# Patient Record
Sex: MALE | Race: BLACK OR AFRICAN AMERICAN | NOT HISPANIC OR LATINO | Employment: OTHER | URBAN - METROPOLITAN AREA
[De-identification: names, ages, dates, MRNs, and addresses within clinical notes are randomized per-mention and may not be internally consistent; named-entity substitution may affect disease eponyms.]

---

## 2017-01-05 ENCOUNTER — HISTORICAL (OUTPATIENT)
Dept: LAB | Facility: HOSPITAL | Age: 52
End: 2017-01-05

## 2017-01-13 ENCOUNTER — HISTORICAL (OUTPATIENT)
Dept: WOUND CARE | Facility: HOSPITAL | Age: 52
End: 2017-01-13

## 2017-02-06 ENCOUNTER — HISTORICAL (OUTPATIENT)
Dept: WOUND CARE | Facility: HOSPITAL | Age: 52
End: 2017-02-06

## 2017-04-18 ENCOUNTER — HISTORICAL (OUTPATIENT)
Dept: WOUND CARE | Facility: HOSPITAL | Age: 52
End: 2017-04-18

## 2017-04-19 ENCOUNTER — HISTORICAL (OUTPATIENT)
Dept: LAB | Facility: HOSPITAL | Age: 52
End: 2017-04-19

## 2017-05-02 ENCOUNTER — HISTORICAL (OUTPATIENT)
Dept: LAB | Facility: HOSPITAL | Age: 52
End: 2017-05-02

## 2017-05-02 LAB
ABS NEUT (OLG): 6.39
ALBUMIN SERPL-MCNC: 3.1 GM/DL (ref 3.4–5)
ALBUMIN/GLOB SERPL: 0.7 RATIO (ref 1.1–2)
ALP SERPL-CCNC: 172 UNIT/L (ref 50–136)
ALT SERPL-CCNC: 35 UNIT/L (ref 12–78)
AST SERPL-CCNC: 26 UNIT/L (ref 10–37)
BASOPHILS # BLD AUTO: 0.05 X10(3)/MCL
BASOPHILS NFR BLD AUTO: 0.5 %
BILIRUB SERPL-MCNC: 0.3 MG/DL (ref 0.2–1)
BILIRUBIN DIRECT+TOT PNL SERPL-MCNC: 0.14 MG/DL (ref 0.05–0.2)
BILIRUBIN DIRECT+TOT PNL SERPL-MCNC: 0.16 MG/DL
BUN SERPL-MCNC: 15 MG/DL (ref 7–18)
CALCIUM SERPL-MCNC: 8.8 MG/DL (ref 8.5–10.1)
CHLORIDE SERPL-SCNC: 98 MMOL/L (ref 98–107)
CO2 SERPL-SCNC: 27 MMOL/L (ref 21–32)
CREAT SERPL-MCNC: 0.96 MG/DL (ref 0.7–1.3)
EOSINOPHIL # BLD AUTO: 0.12 X10(3)/MCL
EOSINOPHIL NFR BLD AUTO: 1.3 %
ERYTHROCYTE [DISTWIDTH] IN BLOOD BY AUTOMATED COUNT: 16 %
ERYTHROCYTE [SEDIMENTATION RATE] IN BLOOD: 63 MM/HR (ref 0–15)
GLOBULIN SER-MCNC: 4.3 GM/DL (ref 2.4–3.5)
GLUCOSE SERPL-MCNC: 219 MG/DL (ref 74–106)
HCT VFR BLD AUTO: 31.4 % (ref 39–49)
HGB BLD-MCNC: 10.3 GM/DL (ref 12.6–16.6)
IMM GRANULOCYTES # BLD AUTO: 0.02 10*3/UL (ref 0–0.1)
IMM GRANULOCYTES NFR BLD AUTO: 0.2 % (ref 0–1)
LYMPHOCYTES # BLD AUTO: 2.42 X10(3)/MCL
LYMPHOCYTES NFR BLD AUTO: 25.4 %
MCH RBC QN AUTO: 29.6 PG (ref 27–33)
MCHC RBC AUTO-ENTMCNC: 32.8 GM/DL (ref 32–35)
MCV RBC AUTO: 90.2 FL (ref 84–97)
MONOCYTES # BLD AUTO: 0.52 X10(3)/MCL
MONOCYTES NFR BLD AUTO: 5.5 %
NEUTROPHILS # BLD AUTO: 6.39 X10(3)/MCL
NEUTROPHILS NFR BLD AUTO: 67.1 %
PLATELET # BLD AUTO: 651 X10(3)/MCL (ref 151–368)
PMV BLD AUTO: 8 FL
POTASSIUM SERPL-SCNC: 4.5 MMOL/L (ref 3.5–5.1)
PROT SERPL-MCNC: 7.4 GM/DL (ref 6.4–8.2)
RBC # BLD AUTO: 3.48 X10(6)/MCL (ref 4.3–5.6)
SODIUM SERPL-SCNC: 132 MMOL/L (ref 136–145)
WBC # SPEC AUTO: 9.52 X10(3)/MCL (ref 3.4–9.2)

## 2017-05-29 ENCOUNTER — HISTORICAL (OUTPATIENT)
Dept: LAB | Facility: HOSPITAL | Age: 52
End: 2017-05-29

## 2017-05-29 LAB
ABS NEUT (OLG): 7.1
ALBUMIN SERPL-MCNC: 3.1 GM/DL (ref 3.4–5)
ALBUMIN/GLOB SERPL: 0.7 RATIO (ref 1.1–2)
ALP SERPL-CCNC: 151 UNIT/L (ref 50–136)
ALT SERPL-CCNC: 23 UNIT/L (ref 12–78)
AST SERPL-CCNC: 21 UNIT/L (ref 10–37)
BASOPHILS # BLD AUTO: 0.04 X10(3)/MCL
BASOPHILS NFR BLD AUTO: 0.4 %
BILIRUB SERPL-MCNC: 0.2 MG/DL (ref 0.2–1)
BILIRUBIN DIRECT+TOT PNL SERPL-MCNC: 0.09 MG/DL
BILIRUBIN DIRECT+TOT PNL SERPL-MCNC: 0.11 MG/DL (ref 0.05–0.2)
BUN SERPL-MCNC: 6 MG/DL (ref 7–18)
CALCIUM SERPL-MCNC: 8.5 MG/DL (ref 8.5–10.1)
CHLORIDE SERPL-SCNC: 85 MMOL/L (ref 98–107)
CO2 SERPL-SCNC: 23.3 MMOL/L (ref 21–32)
CREAT SERPL-MCNC: 0.57 MG/DL (ref 0.7–1.3)
EOSINOPHIL # BLD AUTO: 0.11 X10(3)/MCL
EOSINOPHIL NFR BLD AUTO: 1.1 %
ERYTHROCYTE [DISTWIDTH] IN BLOOD BY AUTOMATED COUNT: 13 %
ERYTHROCYTE [SEDIMENTATION RATE] IN BLOOD: 38 MM/HR (ref 0–15)
GLOBULIN SER-MCNC: 4.5 GM/DL (ref 2.4–3.5)
GLUCOSE SERPL-MCNC: 300 MG/DL (ref 74–106)
HCT VFR BLD AUTO: 34.8 % (ref 39–49)
HGB BLD-MCNC: 12.6 GM/DL (ref 12.6–16.6)
IMM GRANULOCYTES # BLD AUTO: 0.03 10*3/UL (ref 0–0.1)
IMM GRANULOCYTES NFR BLD AUTO: 0.3 % (ref 0–1)
LYMPHOCYTES # BLD AUTO: 2.39 X10(3)/MCL
LYMPHOCYTES NFR BLD AUTO: 23.2 %
MCH RBC QN AUTO: 29.9 PG (ref 27–33)
MCHC RBC AUTO-ENTMCNC: 36.2 GM/DL (ref 32–35)
MCV RBC AUTO: 82.5 FL (ref 84–97)
MONOCYTES # BLD AUTO: 0.63 X10(3)/MCL
MONOCYTES NFR BLD AUTO: 6.1 %
NEUTROPHILS # BLD AUTO: 7.1 X10(3)/MCL
NEUTROPHILS NFR BLD AUTO: 68.9 %
PLATELET # BLD AUTO: 554 X10(3)/MCL (ref 151–368)
PMV BLD AUTO: 8 FL
POTASSIUM SERPL-SCNC: 3.7 MMOL/L (ref 3.5–5.1)
PROT SERPL-MCNC: 7.6 GM/DL (ref 6.4–8.2)
RBC # BLD AUTO: 4.22 X10(6)/MCL (ref 4.3–5.6)
SODIUM SERPL-SCNC: 123 MMOL/L (ref 136–145)
WBC # SPEC AUTO: 10.3 X10(3)/MCL (ref 3.4–9.2)

## 2017-07-12 ENCOUNTER — HISTORICAL (OUTPATIENT)
Dept: LAB | Facility: HOSPITAL | Age: 52
End: 2017-07-12

## 2017-07-12 LAB
ABS NEUT (OLG): 6.57
ALBUMIN SERPL-MCNC: 3 GM/DL (ref 3.4–5)
ALBUMIN/GLOB SERPL: 0.6 RATIO (ref 1.1–2)
ALP SERPL-CCNC: 135 UNIT/L (ref 50–136)
ALT SERPL-CCNC: 39 UNIT/L (ref 12–78)
AST SERPL-CCNC: 28 UNIT/L (ref 10–37)
BASOPHILS # BLD AUTO: 0.04 X10(3)/MCL
BASOPHILS NFR BLD AUTO: 0.4 %
BILIRUB SERPL-MCNC: 0.2 MG/DL (ref 0.2–1)
BILIRUBIN DIRECT+TOT PNL SERPL-MCNC: 0.08 MG/DL (ref 0.05–0.2)
BILIRUBIN DIRECT+TOT PNL SERPL-MCNC: 0.12 MG/DL
BUN SERPL-MCNC: 13 MG/DL (ref 7–18)
CALCIUM SERPL-MCNC: 8.9 MG/DL (ref 8.5–10.1)
CHLORIDE SERPL-SCNC: 94 MMOL/L (ref 98–107)
CO2 SERPL-SCNC: 28.2 MMOL/L (ref 21–32)
CREAT SERPL-MCNC: 0.94 MG/DL (ref 0.7–1.3)
EOSINOPHIL # BLD AUTO: 0.13 X10(3)/MCL
EOSINOPHIL NFR BLD AUTO: 1.2 %
ERYTHROCYTE [DISTWIDTH] IN BLOOD BY AUTOMATED COUNT: 13 %
ERYTHROCYTE [SEDIMENTATION RATE] IN BLOOD: 51 MM/HR (ref 0–15)
GLOBULIN SER-MCNC: 4.8 GM/DL (ref 2.4–3.5)
GLUCOSE SERPL-MCNC: 399 MG/DL (ref 74–106)
HCT VFR BLD AUTO: 35.4 % (ref 39–49)
HGB BLD-MCNC: 12.6 GM/DL (ref 12.6–16.6)
IMM GRANULOCYTES # BLD AUTO: 0.04 10*3/UL (ref 0–0.1)
IMM GRANULOCYTES NFR BLD AUTO: 0.4 % (ref 0–1)
LYMPHOCYTES # BLD AUTO: 3.73 X10(3)/MCL
LYMPHOCYTES NFR BLD AUTO: 33.4 %
MCH RBC QN AUTO: 30.6 PG (ref 27–33)
MCHC RBC AUTO-ENTMCNC: 35.6 GM/DL (ref 32–35)
MCV RBC AUTO: 85.9 FL (ref 84–97)
MONOCYTES # BLD AUTO: 0.66 X10(3)/MCL
MONOCYTES NFR BLD AUTO: 5.9 %
NEUTROPHILS # BLD AUTO: 6.57 X10(3)/MCL
NEUTROPHILS NFR BLD AUTO: 58.7 %
PLATELET # BLD AUTO: 455 X10(3)/MCL (ref 151–368)
PMV BLD AUTO: 8 FL
POTASSIUM SERPL-SCNC: 4.1 MMOL/L (ref 3.5–5.1)
PROT SERPL-MCNC: 7.8 GM/DL (ref 6.4–8.2)
RBC # BLD AUTO: 4.12 X10(6)/MCL (ref 4.3–5.6)
SODIUM SERPL-SCNC: 130 MMOL/L (ref 136–145)
WBC # SPEC AUTO: 11.17 X10(3)/MCL (ref 3.4–9.2)

## 2017-07-14 ENCOUNTER — HISTORICAL (OUTPATIENT)
Dept: WOUND CARE | Facility: HOSPITAL | Age: 52
End: 2017-07-14

## 2018-06-04 ENCOUNTER — HISTORICAL (OUTPATIENT)
Dept: ADMINISTRATIVE | Facility: HOSPITAL | Age: 53
End: 2018-06-04

## 2018-08-09 LAB
ABS NEUT (OLG): 3.26
BASOPHILS # BLD AUTO: 0.07 X10(3)/MCL
BASOPHILS NFR BLD AUTO: 1 %
BUN SERPL-MCNC: 10 MG/DL (ref 7–18)
CALCIUM SERPL-MCNC: 8.3 MG/DL (ref 8.5–10.1)
CHLORIDE SERPL-SCNC: 100 MMOL/L (ref 98–107)
CO2 SERPL-SCNC: 27.5 MMOL/L (ref 21–32)
CREAT SERPL-MCNC: 0.69 MG/DL (ref 0.7–1.3)
CREAT/UREA NIT SERPL: 14
EOSINOPHIL # BLD AUTO: 0.23 X10(3)/MCL
EOSINOPHIL NFR BLD AUTO: 3.4 %
ERYTHROCYTE [DISTWIDTH] IN BLOOD BY AUTOMATED COUNT: 14 %
GLUCOSE SERPL-MCNC: 268 MG/DL (ref 74–106)
HCT VFR BLD AUTO: 27.9 % (ref 39–49)
HGB BLD-MCNC: 9.3 GM/DL (ref 12.6–16.6)
IMM GRANULOCYTES # BLD AUTO: 0.04 10*3/UL (ref 0–0.1)
IMM GRANULOCYTES NFR BLD AUTO: 0.6 % (ref 0–1)
LYMPHOCYTES # BLD AUTO: 2.46 X10(3)/MCL
LYMPHOCYTES NFR BLD AUTO: 36.2 %
MAGNESIUM SERPL-MCNC: 1.4 MG/DL (ref 1.8–2.4)
MCH RBC QN AUTO: 29.7 PG (ref 27–33)
MCHC RBC AUTO-ENTMCNC: 33.3 GM/DL (ref 32–35)
MCV RBC AUTO: 89.1 FL (ref 84–97)
MONOCYTES # BLD AUTO: 0.74 X10(3)/MCL
MONOCYTES NFR BLD AUTO: 10.9 %
NEUTROPHILS # BLD AUTO: 3.26 X10(3)/MCL
NEUTROPHILS NFR BLD AUTO: 47.9 %
PLATELET # BLD AUTO: 703 X10(3)/MCL (ref 151–368)
PMV BLD AUTO: 8 FL
POTASSIUM SERPL-SCNC: 4.2 MMOL/L (ref 3.5–5.1)
RBC # BLD AUTO: 3.13 X10(6)/MCL (ref 4.3–5.6)
SODIUM SERPL-SCNC: 135 MMOL/L (ref 136–145)
WBC # SPEC AUTO: 6.8 X10(3)/MCL (ref 3.4–9.2)

## 2018-08-10 LAB
ABS NEUT (OLG): 3.51
ANION GAP SERPL CALC-SCNC: 18 MMOL/L
BASOPHILS # BLD AUTO: 0.06 X10(3)/MCL
BASOPHILS NFR BLD AUTO: 0.8 %
BUN SERPL-MCNC: 9 MG/DL (ref 8–26)
CHLORIDE SERPL-SCNC: 95 MMOL/L (ref 98–109)
CREAT SERPL-MCNC: 0.5 MG/DL (ref 0.6–1.3)
EOSINOPHIL # BLD AUTO: 0.26 X10(3)/MCL
EOSINOPHIL NFR BLD AUTO: 3.6 %
ERYTHROCYTE [DISTWIDTH] IN BLOOD BY AUTOMATED COUNT: 14 %
GLUCOSE SERPL-MCNC: 291 MG/DL (ref 70–105)
HCT VFR BLD AUTO: 25.7 % (ref 39–49)
HCT VFR BLD CALC: 25 % (ref 38–51)
HGB BLD-MCNC: 8.5 GM/DL (ref 12.6–16.6)
HGB BLD-MCNC: 8.5 MG/DL (ref 12–17)
IMM GRANULOCYTES # BLD AUTO: 0.05 10*3/UL (ref 0–0.1)
IMM GRANULOCYTES NFR BLD AUTO: 0.7 % (ref 0–1)
LYMPHOCYTES # BLD AUTO: 2.56 X10(3)/MCL
LYMPHOCYTES NFR BLD AUTO: 35.1 %
MAGNESIUM SERPL-MCNC: 1.5 MG/DL (ref 1.8–2.4)
MCH RBC QN AUTO: 29.7 PG (ref 27–33)
MCHC RBC AUTO-ENTMCNC: 33.1 GM/DL (ref 32–35)
MCV RBC AUTO: 89.9 FL (ref 84–97)
MONOCYTES # BLD AUTO: 0.86 X10(3)/MCL
MONOCYTES NFR BLD AUTO: 11.8 %
NEUTROPHILS # BLD AUTO: 3.51 X10(3)/MCL
NEUTROPHILS NFR BLD AUTO: 48 %
PLATELET # BLD AUTO: 698 X10(3)/MCL (ref 151–368)
PMV BLD AUTO: 8 FL
POC IONIZED CALCIUM: >2.5 MMOL/L (ref 1.12–1.32)
POC TCO2: 28 MMOL/L (ref 24–29)
POTASSIUM BLD-SCNC: 4.2 MMOL/L (ref 3.5–4.9)
RBC # BLD AUTO: 2.86 X10(6)/MCL (ref 4.3–5.6)
SODIUM BLD-SCNC: 136 MMOL/L (ref 138–146)
VANCOMYCIN TROUGH SERPL-MCNC: 12.1 MCG/ML (ref 5–10)
WBC # SPEC AUTO: 7.3 X10(3)/MCL (ref 3.4–9.2)

## 2018-08-11 LAB
ABS NEUT (OLG): 5.01
BASOPHILS # BLD AUTO: 0.04 X10(3)/MCL
BASOPHILS NFR BLD AUTO: 0.4 %
BUN SERPL-MCNC: 13 MG/DL (ref 7–18)
CALCIUM SERPL-MCNC: 8.8 MG/DL (ref 8.5–10.1)
CHLORIDE SERPL-SCNC: 98 MMOL/L (ref 98–107)
CO2 SERPL-SCNC: 28.2 MMOL/L (ref 21–32)
CREAT SERPL-MCNC: 0.61 MG/DL (ref 0.7–1.3)
CREAT/UREA NIT SERPL: 21
EOSINOPHIL # BLD AUTO: 0.27 X10(3)/MCL
EOSINOPHIL NFR BLD AUTO: 3 %
ERYTHROCYTE [DISTWIDTH] IN BLOOD BY AUTOMATED COUNT: 14 %
GLUCOSE SERPL-MCNC: 320 MG/DL (ref 74–106)
HCT VFR BLD AUTO: 28.1 % (ref 39–49)
HGB BLD-MCNC: 9.4 GM/DL (ref 12.6–16.6)
IMM GRANULOCYTES # BLD AUTO: 0.06 10*3/UL (ref 0–0.1)
IMM GRANULOCYTES NFR BLD AUTO: 0.7 % (ref 0–1)
LYMPHOCYTES # BLD AUTO: 2.43 X10(3)/MCL
LYMPHOCYTES NFR BLD AUTO: 27.3 %
MAGNESIUM SERPL-MCNC: 1.4 MG/DL (ref 1.8–2.4)
MCH RBC QN AUTO: 29.9 PG (ref 27–33)
MCHC RBC AUTO-ENTMCNC: 33.5 GM/DL (ref 32–35)
MCV RBC AUTO: 89.5 FL (ref 84–97)
MONOCYTES # BLD AUTO: 1.08 X10(3)/MCL
MONOCYTES NFR BLD AUTO: 12.1 %
NEUTROPHILS # BLD AUTO: 5.01 X10(3)/MCL
NEUTROPHILS NFR BLD AUTO: 56.5 %
PLATELET # BLD AUTO: 670 X10(3)/MCL (ref 151–368)
PMV BLD AUTO: 9 FL
POTASSIUM SERPL-SCNC: 4.1 MMOL/L (ref 3.5–5.1)
RBC # BLD AUTO: 3.14 X10(6)/MCL (ref 4.3–5.6)
SODIUM SERPL-SCNC: 134 MMOL/L (ref 136–145)
WBC # SPEC AUTO: 8.89 X10(3)/MCL (ref 3.4–9.2)

## 2018-08-12 LAB
ABS NEUT (OLG): 4.03
BUN SERPL-MCNC: 13 MG/DL (ref 7–18)
CALCIUM SERPL-MCNC: 9 MG/DL (ref 8.5–10.1)
CHLORIDE SERPL-SCNC: 98 MMOL/L (ref 98–107)
CO2 SERPL-SCNC: 30.6 MMOL/L (ref 21–32)
CREAT SERPL-MCNC: 0.78 MG/DL (ref 0.7–1.3)
CREAT/UREA NIT SERPL: 17
EOSINOPHIL NFR BLD MANUAL: 7 % (ref 0–8)
ERYTHROCYTE [DISTWIDTH] IN BLOOD BY AUTOMATED COUNT: 14 %
GLUCOSE SERPL-MCNC: 185 MG/DL (ref 74–106)
GRANULOCYTES NFR BLD MANUAL: 53 % (ref 47–80)
HCT VFR BLD AUTO: 29.1 % (ref 39–49)
HGB BLD-MCNC: 9.6 GM/DL (ref 12.6–16.6)
LYMPHOCYTES NFR BLD MANUAL: 37 % (ref 13–40)
MAGNESIUM SERPL-MCNC: 1.7 MG/DL (ref 1.8–2.4)
MCH RBC QN AUTO: 29.4 PG (ref 27–33)
MCHC RBC AUTO-ENTMCNC: 33 GM/DL (ref 32–35)
MCV RBC AUTO: 89.3 FL (ref 84–97)
MONOCYTES NFR BLD MANUAL: 3 % (ref 2–11)
PLATELET # BLD AUTO: 672 X10(3)/MCL (ref 151–368)
PMV BLD AUTO: 8 FL
POTASSIUM SERPL-SCNC: 4.4 MMOL/L (ref 3.5–5.1)
RBC # BLD AUTO: 3.26 X10(6)/MCL (ref 4.3–5.6)
SODIUM SERPL-SCNC: 132 MMOL/L (ref 136–145)
WBC # SPEC AUTO: 8.21 X10(3)/MCL (ref 3.4–9.2)

## 2018-08-13 LAB
CK MB SERPL-MCNC: 1.9 NG/ML (ref 0.5–3.6)
CK MB SERPL-MCNC: 2.6 NG/ML (ref 0.5–3.6)
CK SERPL-CCNC: 50 MG/DL (ref 26–308)
CK SERPL-CCNC: 60 MG/DL (ref 26–308)
MAGNESIUM SERPL-MCNC: 1.6 MG/DL (ref 1.8–2.4)
TROPONIN I SERPL-MCNC: 0.09 NG/ML (ref 0.02–0.06)
TROPONIN I SERPL-MCNC: 0.09 NG/ML (ref 0.02–0.06)
VANCOMYCIN TROUGH SERPL-MCNC: 15.5 MCG/ML (ref 5–10)

## 2018-08-14 LAB
BUN SERPL-MCNC: 15 MG/DL (ref 7–18)
CALCIUM SERPL-MCNC: 8.9 MG/DL (ref 8.5–10.1)
CHLORIDE SERPL-SCNC: 98 MMOL/L (ref 98–107)
CO2 SERPL-SCNC: 29.5 MMOL/L (ref 21–32)
CREAT SERPL-MCNC: 0.78 MG/DL (ref 0.7–1.3)
CREAT/UREA NIT SERPL: 19
FINAL CULTURE: NORMAL
FINAL CULTURE: NORMAL
GLUCOSE SERPL-MCNC: 234 MG/DL (ref 74–106)
MAGNESIUM SERPL-MCNC: 1.7 MG/DL (ref 1.8–2.4)
POTASSIUM SERPL-SCNC: 4.4 MMOL/L (ref 3.5–5.1)
SODIUM SERPL-SCNC: 132 MMOL/L (ref 136–145)

## 2018-08-15 ENCOUNTER — HISTORICAL (OUTPATIENT)
Dept: MEDSURG UNIT | Facility: HOSPITAL | Age: 53
End: 2018-08-15

## 2018-08-15 LAB
ABS NEUT (OLG): 4.02
ALBUMIN SERPL-MCNC: 2.5 GM/DL (ref 3.4–5)
ALBUMIN/GLOB SERPL: 0.5 RATIO (ref 1.1–2)
ALP SERPL-CCNC: 253 UNIT/L (ref 46–116)
ALT SERPL-CCNC: 41 UNIT/L (ref 12–78)
APTT PPP: 24.5 SECOND(S) (ref 24.5–32.8)
AST SERPL-CCNC: 44 UNIT/L (ref 10–37)
BASOPHILS # BLD AUTO: 0.07 X10(3)/MCL
BASOPHILS NFR BLD AUTO: 0.8 %
BILIRUB SERPL-MCNC: 0.2 MG/DL (ref 0.2–1)
BILIRUBIN DIRECT+TOT PNL SERPL-MCNC: 0.07 MG/DL (ref 0–0.2)
BILIRUBIN DIRECT+TOT PNL SERPL-MCNC: 0.13 MG/DL
BUN SERPL-MCNC: 16 MG/DL (ref 7–18)
CALCIUM SERPL-MCNC: 9.1 MG/DL (ref 8.5–10.1)
CHLORIDE SERPL-SCNC: 96 MMOL/L (ref 98–107)
CO2 SERPL-SCNC: 29 MMOL/L (ref 21–32)
CREAT SERPL-MCNC: 0.74 MG/DL (ref 0.7–1.3)
EOSINOPHIL # BLD AUTO: 0.22 X10(3)/MCL
EOSINOPHIL NFR BLD AUTO: 2.6 %
ERYTHROCYTE [DISTWIDTH] IN BLOOD BY AUTOMATED COUNT: 14 %
GLOBULIN SER-MCNC: 5.4 GM/DL (ref 2.4–3.5)
GLUCOSE SERPL-MCNC: 213 MG/DL (ref 74–106)
GROUP & RH: NORMAL
GROUP & RH: NORMAL
HCT VFR BLD AUTO: 24.6 % (ref 39–49)
HCT VFR BLD AUTO: 29.3 % (ref 39–49)
HGB BLD-MCNC: 8.2 GM/DL (ref 12.6–16.6)
HGB BLD-MCNC: 9.9 GM/DL (ref 12.6–16.6)
IMM GRANULOCYTES # BLD AUTO: 0.11 10*3/UL (ref 0–0.1)
IMM GRANULOCYTES NFR BLD AUTO: 1.3 % (ref 0–1)
INR PPP: 0.9
LYMPHOCYTES # BLD AUTO: 2.82 X10(3)/MCL
LYMPHOCYTES NFR BLD AUTO: 33.3 %
MCH RBC QN AUTO: 30.2 PG (ref 27–33)
MCHC RBC AUTO-ENTMCNC: 33.8 GM/DL (ref 32–35)
MCV RBC AUTO: 89.3 FL (ref 84–97)
MONOCYTES # BLD AUTO: 1.24 X10(3)/MCL
MONOCYTES NFR BLD AUTO: 14.6 %
NEUTROPHILS # BLD AUTO: 4.02 X10(3)/MCL
NEUTROPHILS NFR BLD AUTO: 47.4 %
PLATELET # BLD AUTO: 637 X10(3)/MCL (ref 151–368)
PMV BLD AUTO: 9 FL
POTASSIUM SERPL-SCNC: 4.3 MMOL/L (ref 3.5–5.1)
PRODUCT READY: NORMAL
PROT SERPL-MCNC: 7.9 GM/DL (ref 6.4–8.2)
PROTHROMBIN TIME: 9.5 SECOND(S) (ref 9.3–11.4)
RBC # BLD AUTO: 3.28 X10(6)/MCL (ref 4.3–5.6)
SODIUM SERPL-SCNC: 132 MMOL/L (ref 136–145)
TRANSFUSION ORDER: NORMAL
WBC # SPEC AUTO: 8.48 X10(3)/MCL (ref 3.4–9.2)

## 2018-08-16 LAB
ABS NEUT (OLG): 6.09
ALBUMIN SERPL-MCNC: 2.7 GM/DL (ref 3.4–5)
ALBUMIN/GLOB SERPL: 0.6 RATIO (ref 1.1–2)
ALP SERPL-CCNC: 234 UNIT/L (ref 46–116)
ALT SERPL-CCNC: 41 UNIT/L (ref 12–78)
AST SERPL-CCNC: 34 UNIT/L (ref 10–37)
BASOPHILS # BLD AUTO: 0.03 X10(3)/MCL
BASOPHILS NFR BLD AUTO: 0.3 %
BILIRUB SERPL-MCNC: 0.2 MG/DL (ref 0.2–1)
BILIRUBIN DIRECT+TOT PNL SERPL-MCNC: 0.06 MG/DL (ref 0–0.2)
BILIRUBIN DIRECT+TOT PNL SERPL-MCNC: 0.14 MG/DL
BUN SERPL-MCNC: 20 MG/DL (ref 7–18)
CALCIUM SERPL-MCNC: 8.6 MG/DL (ref 8.5–10.1)
CHLORIDE SERPL-SCNC: 95 MMOL/L (ref 98–107)
CO2 SERPL-SCNC: 28.4 MMOL/L (ref 21–32)
CREAT SERPL-MCNC: 0.74 MG/DL (ref 0.7–1.3)
EOSINOPHIL # BLD AUTO: 0.08 X10(3)/MCL
EOSINOPHIL NFR BLD AUTO: 0.7 %
ERYTHROCYTE [DISTWIDTH] IN BLOOD BY AUTOMATED COUNT: 15 %
GLOBULIN SER-MCNC: 4.8 GM/DL (ref 2.4–3.5)
GLUCOSE SERPL-MCNC: 214 MG/DL (ref 74–106)
HCT VFR BLD AUTO: 26.1 % (ref 39–49)
HGB BLD-MCNC: 8.8 GM/DL (ref 12.6–16.6)
IMM GRANULOCYTES # BLD AUTO: 0.08 10*3/UL (ref 0–0.1)
IMM GRANULOCYTES NFR BLD AUTO: 0.7 % (ref 0–1)
LYMPHOCYTES # BLD AUTO: 3.17 X10(3)/MCL
LYMPHOCYTES NFR BLD AUTO: 29.5 %
MCH RBC QN AUTO: 29.7 PG (ref 27–33)
MCHC RBC AUTO-ENTMCNC: 33.7 GM/DL (ref 32–35)
MCV RBC AUTO: 88.2 FL (ref 84–97)
MONOCYTES # BLD AUTO: 1.28 X10(3)/MCL
MONOCYTES NFR BLD AUTO: 11.9 %
NEUTROPHILS # BLD AUTO: 6.09 X10(3)/MCL
NEUTROPHILS NFR BLD AUTO: 56.9 %
PLATELET # BLD AUTO: 512 X10(3)/MCL (ref 151–368)
PMV BLD AUTO: 8 FL
POTASSIUM SERPL-SCNC: 3.9 MMOL/L (ref 3.5–5.1)
PROT SERPL-MCNC: 7.5 GM/DL (ref 6.4–8.2)
RBC # BLD AUTO: 2.96 X10(6)/MCL (ref 4.3–5.6)
SODIUM SERPL-SCNC: 130 MMOL/L (ref 136–145)
WBC # SPEC AUTO: 10.73 X10(3)/MCL (ref 3.4–9.2)

## 2018-08-17 LAB
PRODUCT READY: NORMAL
TRANSFUSION ORDER: NORMAL

## 2018-08-18 LAB
ABS NEUT (OLG): 4.39
ALBUMIN SERPL-MCNC: 2.7 GM/DL (ref 3.4–5)
ALBUMIN/GLOB SERPL: 0.6 RATIO (ref 1.1–2)
ALP SERPL-CCNC: 204 UNIT/L (ref 46–116)
ALT SERPL-CCNC: 41 UNIT/L (ref 12–78)
AST SERPL-CCNC: 38 UNIT/L (ref 10–37)
BASOPHILS # BLD AUTO: 0.07 X10(3)/MCL
BASOPHILS NFR BLD AUTO: 0.8 %
BILIRUB SERPL-MCNC: 0.3 MG/DL (ref 0.2–1)
BILIRUBIN DIRECT+TOT PNL SERPL-MCNC: 0.08 MG/DL (ref 0–0.2)
BILIRUBIN DIRECT+TOT PNL SERPL-MCNC: 0.22 MG/DL
BUN SERPL-MCNC: 20 MG/DL (ref 7–18)
CALCIUM SERPL-MCNC: 8.7 MG/DL (ref 8.5–10.1)
CHLORIDE SERPL-SCNC: 96 MMOL/L (ref 98–107)
CO2 SERPL-SCNC: 28.5 MMOL/L (ref 21–32)
CREAT SERPL-MCNC: 0.63 MG/DL (ref 0.7–1.3)
EOSINOPHIL # BLD AUTO: 0.17 X10(3)/MCL
EOSINOPHIL NFR BLD AUTO: 1.9 %
ERYTHROCYTE [DISTWIDTH] IN BLOOD BY AUTOMATED COUNT: 15 %
FINAL CULTURE: NORMAL
FINAL CULTURE: NORMAL
GLOBULIN SER-MCNC: 4.7 GM/DL (ref 2.4–3.5)
GLUCOSE SERPL-MCNC: 159 MG/DL (ref 74–106)
HCT VFR BLD AUTO: 31.4 % (ref 39–49)
HGB BLD-MCNC: 10.7 GM/DL (ref 12.6–16.6)
IMM GRANULOCYTES # BLD AUTO: 0.11 10*3/UL (ref 0–0.1)
IMM GRANULOCYTES NFR BLD AUTO: 1.2 % (ref 0–1)
LYMPHOCYTES # BLD AUTO: 3.27 X10(3)/MCL
LYMPHOCYTES NFR BLD AUTO: 35.9 %
MCH RBC QN AUTO: 29.6 PG (ref 27–33)
MCHC RBC AUTO-ENTMCNC: 34.1 GM/DL (ref 32–35)
MCV RBC AUTO: 87 FL (ref 84–97)
MONOCYTES # BLD AUTO: 1.11 X10(3)/MCL
MONOCYTES NFR BLD AUTO: 12.2 %
NEUTROPHILS # BLD AUTO: 4.39 X10(3)/MCL
NEUTROPHILS NFR BLD AUTO: 48 %
PLATELET # BLD AUTO: 481 X10(3)/MCL (ref 151–368)
PMV BLD AUTO: 9 FL
POTASSIUM SERPL-SCNC: 4.2 MMOL/L (ref 3.5–5.1)
PROT SERPL-MCNC: 7.4 GM/DL (ref 6.4–8.2)
RBC # BLD AUTO: 3.61 X10(6)/MCL (ref 4.3–5.6)
SODIUM SERPL-SCNC: 132 MMOL/L (ref 136–145)
WBC # SPEC AUTO: 9.12 X10(3)/MCL (ref 3.4–9.2)

## 2018-08-19 ENCOUNTER — HISTORICAL (OUTPATIENT)
Dept: ADMINISTRATIVE | Facility: HOSPITAL | Age: 53
End: 2018-08-19

## 2018-08-19 LAB
ABS NEUT (OLG): 5.22
ALBUMIN SERPL-MCNC: 2.8 GM/DL (ref 3.4–5)
ALBUMIN/GLOB SERPL: 0.6 RATIO (ref 1.1–2)
ALP SERPL-CCNC: 227 UNIT/L (ref 46–116)
ALT SERPL-CCNC: 44 UNIT/L (ref 12–78)
AST SERPL-CCNC: 52 UNIT/L (ref 10–37)
BASOPHILS # BLD AUTO: 0.05 X10(3)/MCL
BASOPHILS NFR BLD AUTO: 0.5 %
BILIRUB SERPL-MCNC: 0.3 MG/DL (ref 0.2–1)
BILIRUBIN DIRECT+TOT PNL SERPL-MCNC: 0.09 MG/DL (ref 0–0.2)
BILIRUBIN DIRECT+TOT PNL SERPL-MCNC: 0.21 MG/DL
BUN SERPL-MCNC: 17 MG/DL (ref 7–18)
CALCIUM SERPL-MCNC: 8.7 MG/DL (ref 8.5–10.1)
CHLORIDE SERPL-SCNC: 97 MMOL/L (ref 98–107)
CO2 SERPL-SCNC: 28.3 MMOL/L (ref 21–32)
CREAT SERPL-MCNC: 0.65 MG/DL (ref 0.7–1.3)
EOSINOPHIL # BLD AUTO: 0.14 X10(3)/MCL
EOSINOPHIL NFR BLD AUTO: 1.4 %
ERYTHROCYTE [DISTWIDTH] IN BLOOD BY AUTOMATED COUNT: 15 %
GLOBULIN SER-MCNC: 4.9 GM/DL (ref 2.4–3.5)
GLUCOSE SERPL-MCNC: 142 MG/DL (ref 74–106)
HCT VFR BLD AUTO: 32.5 % (ref 39–49)
HGB BLD-MCNC: 11.2 GM/DL (ref 12.6–16.6)
IMM GRANULOCYTES # BLD AUTO: 0.1 10*3/UL (ref 0–0.1)
IMM GRANULOCYTES NFR BLD AUTO: 1 % (ref 0–1)
LYMPHOCYTES # BLD AUTO: 3.18 X10(3)/MCL
LYMPHOCYTES NFR BLD AUTO: 32.4 %
MCH RBC QN AUTO: 29.9 PG (ref 27–33)
MCHC RBC AUTO-ENTMCNC: 34.5 GM/DL (ref 32–35)
MCV RBC AUTO: 86.7 FL (ref 84–97)
MONOCYTES # BLD AUTO: 1.13 X10(3)/MCL
MONOCYTES NFR BLD AUTO: 11.5 %
NEUTROPHILS # BLD AUTO: 5.22 X10(3)/MCL
NEUTROPHILS NFR BLD AUTO: 53.2 %
PLATELET # BLD AUTO: 510 X10(3)/MCL (ref 151–368)
PMV BLD AUTO: 8 FL
POTASSIUM SERPL-SCNC: 4.3 MMOL/L (ref 3.5–5.1)
PROT SERPL-MCNC: 7.7 GM/DL (ref 6.4–8.2)
RBC # BLD AUTO: 3.75 X10(6)/MCL (ref 4.3–5.6)
SODIUM SERPL-SCNC: 131 MMOL/L (ref 136–145)
WBC # SPEC AUTO: 9.82 X10(3)/MCL (ref 3.4–9.2)

## 2018-08-20 LAB — FINAL CULTURE: NO GROWTH

## 2018-08-22 LAB
ABS NEUT (OLG): 4.72
BASOPHILS # BLD AUTO: 0.04 X10(3)/MCL
BASOPHILS NFR BLD AUTO: 0.5 %
BUN SERPL-MCNC: 10 MG/DL (ref 7–18)
CALCIUM SERPL-MCNC: 9.2 MG/DL (ref 8.5–10.1)
CHLORIDE SERPL-SCNC: 96 MMOL/L (ref 98–107)
CO2 SERPL-SCNC: 27 MMOL/L (ref 21–32)
CREAT SERPL-MCNC: 0.59 MG/DL (ref 0.7–1.3)
CREAT/UREA NIT SERPL: 17
EOSINOPHIL # BLD AUTO: 0.14 X10(3)/MCL
EOSINOPHIL NFR BLD AUTO: 1.6 %
ERYTHROCYTE [DISTWIDTH] IN BLOOD BY AUTOMATED COUNT: 14 %
GLUCOSE SERPL-MCNC: 104 MG/DL (ref 74–106)
HCT VFR BLD AUTO: 32.4 % (ref 39–49)
HGB BLD-MCNC: 11.2 GM/DL (ref 12.6–16.6)
IMM GRANULOCYTES # BLD AUTO: 0.05 10*3/UL (ref 0–0.1)
IMM GRANULOCYTES NFR BLD AUTO: 0.6 % (ref 0–1)
LYMPHOCYTES # BLD AUTO: 2.83 X10(3)/MCL
LYMPHOCYTES NFR BLD AUTO: 32.9 %
MAGNESIUM SERPL-MCNC: 1.7 MG/DL (ref 1.8–2.4)
MCH RBC QN AUTO: 29.9 PG (ref 27–33)
MCHC RBC AUTO-ENTMCNC: 34.6 GM/DL (ref 32–35)
MCV RBC AUTO: 86.4 FL (ref 84–97)
MONOCYTES # BLD AUTO: 0.81 X10(3)/MCL
MONOCYTES NFR BLD AUTO: 9.4 %
NEUTROPHILS # BLD AUTO: 4.72 X10(3)/MCL
NEUTROPHILS NFR BLD AUTO: 55 %
PLATELET # BLD AUTO: 506 X10(3)/MCL (ref 151–368)
PMV BLD AUTO: 8 FL
POTASSIUM SERPL-SCNC: 4.4 MMOL/L (ref 3.5–5.1)
RBC # BLD AUTO: 3.75 X10(6)/MCL (ref 4.3–5.6)
SODIUM SERPL-SCNC: 128 MMOL/L (ref 136–145)
WBC # SPEC AUTO: 8.59 X10(3)/MCL (ref 3.4–9.2)

## 2018-08-23 ENCOUNTER — HISTORICAL (OUTPATIENT)
Dept: MEDSURG UNIT | Facility: HOSPITAL | Age: 53
End: 2018-08-23

## 2018-08-23 LAB — FINAL CULTURE: NORMAL

## 2018-08-24 LAB
ABS NEUT (OLG): 6.92
BASOPHILS # BLD AUTO: 0.04 X10(3)/MCL
BASOPHILS NFR BLD AUTO: 0.4 %
BUN SERPL-MCNC: 16 MG/DL (ref 7–18)
CALCIUM SERPL-MCNC: 8.6 MG/DL (ref 8.5–10.1)
CHLORIDE SERPL-SCNC: 99 MMOL/L (ref 98–107)
CO2 SERPL-SCNC: 28.7 MMOL/L (ref 21–32)
CREAT SERPL-MCNC: 0.64 MG/DL (ref 0.7–1.3)
CREAT/UREA NIT SERPL: 25
EOSINOPHIL # BLD AUTO: 0.11 X10(3)/MCL
EOSINOPHIL NFR BLD AUTO: 1 %
ERYTHROCYTE [DISTWIDTH] IN BLOOD BY AUTOMATED COUNT: 14 %
GLUCOSE SERPL-MCNC: 147 MG/DL (ref 74–106)
HCT VFR BLD AUTO: 31.9 % (ref 39–49)
HGB BLD-MCNC: 10.6 GM/DL (ref 12.6–16.6)
IMM GRANULOCYTES # BLD AUTO: 0.05 10*3/UL (ref 0–0.1)
IMM GRANULOCYTES NFR BLD AUTO: 0.4 % (ref 0–1)
LYMPHOCYTES # BLD AUTO: 3.01 X10(3)/MCL
LYMPHOCYTES NFR BLD AUTO: 26.5 %
MAGNESIUM SERPL-MCNC: 1.7 MG/DL (ref 1.8–2.4)
MCH RBC QN AUTO: 29 PG (ref 27–33)
MCHC RBC AUTO-ENTMCNC: 33.2 GM/DL (ref 32–35)
MCV RBC AUTO: 87.2 FL (ref 84–97)
MONOCYTES # BLD AUTO: 1.23 X10(3)/MCL
MONOCYTES NFR BLD AUTO: 10.8 %
NEUTROPHILS # BLD AUTO: 6.92 X10(3)/MCL
NEUTROPHILS NFR BLD AUTO: 60.9 %
PLATELET # BLD AUTO: 508 X10(3)/MCL (ref 151–368)
PMV BLD AUTO: 8 FL
POTASSIUM SERPL-SCNC: 4.5 MMOL/L (ref 3.5–5.1)
RBC # BLD AUTO: 3.66 X10(6)/MCL (ref 4.3–5.6)
SODIUM SERPL-SCNC: 132 MMOL/L (ref 136–145)
WBC # SPEC AUTO: 11.36 X10(3)/MCL (ref 3.4–9.2)

## 2018-08-25 LAB
ABS NEUT (OLG): 5.51
BASOPHILS # BLD AUTO: 0.03 X10(3)/MCL
BASOPHILS NFR BLD AUTO: 0.3 %
EOSINOPHIL # BLD AUTO: 0.1 X10(3)/MCL
EOSINOPHIL NFR BLD AUTO: 1 %
ERYTHROCYTE [DISTWIDTH] IN BLOOD BY AUTOMATED COUNT: 14 %
HCT VFR BLD AUTO: 29.7 % (ref 39–49)
HGB BLD-MCNC: 10.2 GM/DL (ref 12.6–16.6)
IMM GRANULOCYTES # BLD AUTO: 0.02 10*3/UL (ref 0–0.1)
IMM GRANULOCYTES NFR BLD AUTO: 0.2 % (ref 0–1)
LYMPHOCYTES # BLD AUTO: 3.24 X10(3)/MCL
LYMPHOCYTES NFR BLD AUTO: 33.1 %
MCH RBC QN AUTO: 30.2 PG (ref 27–33)
MCHC RBC AUTO-ENTMCNC: 34.3 GM/DL (ref 32–35)
MCV RBC AUTO: 87.9 FL (ref 84–97)
MONOCYTES # BLD AUTO: 0.88 X10(3)/MCL
MONOCYTES NFR BLD AUTO: 9 %
NEUTROPHILS # BLD AUTO: 5.51 X10(3)/MCL
NEUTROPHILS NFR BLD AUTO: 56.4 %
PLATELET # BLD AUTO: 472 X10(3)/MCL (ref 151–368)
PMV BLD AUTO: 8 FL
RBC # BLD AUTO: 3.38 X10(6)/MCL (ref 4.3–5.6)
WBC # SPEC AUTO: 9.78 X10(3)/MCL (ref 3.4–9.2)

## 2018-08-26 LAB
ABS NEUT (OLG): 5.6
BASOPHILS # BLD AUTO: 0.05 X10(3)/MCL
BASOPHILS NFR BLD AUTO: 0.5 %
BUN SERPL-MCNC: 15 MG/DL (ref 7–18)
CALCIUM SERPL-MCNC: 8.6 MG/DL (ref 8.5–10.1)
CHLORIDE SERPL-SCNC: 98 MMOL/L (ref 98–107)
CO2 SERPL-SCNC: 23.5 MMOL/L (ref 21–32)
CREAT SERPL-MCNC: 0.89 MG/DL (ref 0.7–1.3)
CREAT/UREA NIT SERPL: 17
EOSINOPHIL # BLD AUTO: 0.14 X10(3)/MCL
EOSINOPHIL NFR BLD AUTO: 1.4 %
ERYTHROCYTE [DISTWIDTH] IN BLOOD BY AUTOMATED COUNT: 14 %
GLUCOSE SERPL-MCNC: 198 MG/DL (ref 74–106)
HCT VFR BLD AUTO: 31.1 % (ref 39–49)
HGB BLD-MCNC: 10.6 GM/DL (ref 12.6–16.6)
IMM GRANULOCYTES # BLD AUTO: 0.03 10*3/UL (ref 0–0.1)
IMM GRANULOCYTES NFR BLD AUTO: 0.3 % (ref 0–1)
LYMPHOCYTES # BLD AUTO: 3.24 X10(3)/MCL
LYMPHOCYTES NFR BLD AUTO: 33 %
MAGNESIUM SERPL-MCNC: 1.5 MG/DL (ref 1.8–2.4)
MCH RBC QN AUTO: 29.6 PG (ref 27–33)
MCHC RBC AUTO-ENTMCNC: 34.1 GM/DL (ref 32–35)
MCV RBC AUTO: 86.9 FL (ref 84–97)
MONOCYTES # BLD AUTO: 0.76 X10(3)/MCL
MONOCYTES NFR BLD AUTO: 7.7 %
NEUTROPHILS # BLD AUTO: 5.6 X10(3)/MCL
NEUTROPHILS NFR BLD AUTO: 57.1 %
PLATELET # BLD AUTO: 482 X10(3)/MCL (ref 151–368)
PMV BLD AUTO: 8 FL
POTASSIUM SERPL-SCNC: 4.3 MMOL/L (ref 3.5–5.1)
RBC # BLD AUTO: 3.58 X10(6)/MCL (ref 4.3–5.6)
SODIUM SERPL-SCNC: 129 MMOL/L (ref 136–145)
WBC # SPEC AUTO: 9.82 X10(3)/MCL (ref 3.4–9.2)

## 2018-08-27 LAB
ABS NEUT (OLG): 5.85
BASOPHILS # BLD AUTO: 0.05 X10(3)/MCL
BASOPHILS NFR BLD AUTO: 0.5 %
BUN SERPL-MCNC: 22 MG/DL (ref 7–18)
CALCIUM SERPL-MCNC: 9 MG/DL (ref 8.5–10.1)
CHLORIDE SERPL-SCNC: 100 MMOL/L (ref 98–107)
CO2 SERPL-SCNC: 23.5 MMOL/L (ref 21–32)
CREAT SERPL-MCNC: 0.73 MG/DL (ref 0.7–1.3)
CREAT/UREA NIT SERPL: 30
EOSINOPHIL # BLD AUTO: 0.14 X10(3)/MCL
EOSINOPHIL NFR BLD AUTO: 1.4 %
ERYTHROCYTE [DISTWIDTH] IN BLOOD BY AUTOMATED COUNT: 14 %
GLUCOSE SERPL-MCNC: 217 MG/DL (ref 74–106)
HCT VFR BLD AUTO: 32 % (ref 39–49)
HGB BLD-MCNC: 10.9 GM/DL (ref 12.6–16.6)
IMM GRANULOCYTES # BLD AUTO: 0.04 10*3/UL (ref 0–0.1)
IMM GRANULOCYTES NFR BLD AUTO: 0.4 % (ref 0–1)
LYMPHOCYTES # BLD AUTO: 2.86 X10(3)/MCL
LYMPHOCYTES NFR BLD AUTO: 29.5 %
MAGNESIUM SERPL-MCNC: 1.9 MG/DL (ref 1.8–2.4)
MCH RBC QN AUTO: 29.6 PG (ref 27–33)
MCHC RBC AUTO-ENTMCNC: 34.1 GM/DL (ref 32–35)
MCV RBC AUTO: 87 FL (ref 84–97)
MONOCYTES # BLD AUTO: 0.75 X10(3)/MCL
MONOCYTES NFR BLD AUTO: 7.7 %
NEUTROPHILS # BLD AUTO: 5.85 X10(3)/MCL
NEUTROPHILS NFR BLD AUTO: 60.5 %
PLATELET # BLD AUTO: 514 X10(3)/MCL (ref 151–368)
PMV BLD AUTO: 8 FL
POTASSIUM SERPL-SCNC: 4.6 MMOL/L (ref 3.5–5.1)
RBC # BLD AUTO: 3.68 X10(6)/MCL (ref 4.3–5.6)
SODIUM SERPL-SCNC: 134 MMOL/L (ref 136–145)
WBC # SPEC AUTO: 9.69 X10(3)/MCL (ref 3.4–9.2)

## 2018-08-28 LAB
ABS NEUT (OLG): 6
BASOPHILS # BLD AUTO: 0.05 X10(3)/MCL
BASOPHILS NFR BLD AUTO: 0.5 %
BUN SERPL-MCNC: 17 MG/DL (ref 7–18)
CALCIUM SERPL-MCNC: 8.8 MG/DL (ref 8.5–10.1)
CHLORIDE SERPL-SCNC: 98 MMOL/L (ref 98–107)
CO2 SERPL-SCNC: 25.5 MMOL/L (ref 21–32)
CREAT SERPL-MCNC: 0.74 MG/DL (ref 0.7–1.3)
CREAT/UREA NIT SERPL: 23
EOSINOPHIL # BLD AUTO: 0.12 X10(3)/MCL
EOSINOPHIL NFR BLD AUTO: 1.1 %
ERYTHROCYTE [DISTWIDTH] IN BLOOD BY AUTOMATED COUNT: 14 %
GLUCOSE SERPL-MCNC: 239 MG/DL (ref 74–106)
HCT VFR BLD AUTO: 31.4 % (ref 39–49)
HGB BLD-MCNC: 11 GM/DL (ref 12.6–16.6)
IMM GRANULOCYTES # BLD AUTO: 0.04 10*3/UL (ref 0–0.1)
IMM GRANULOCYTES NFR BLD AUTO: 0.4 % (ref 0–1)
LYMPHOCYTES # BLD AUTO: 3.85 X10(3)/MCL
LYMPHOCYTES NFR BLD AUTO: 35.2 %
MAGNESIUM SERPL-MCNC: 1.6 MG/DL (ref 1.8–2.4)
MCH RBC QN AUTO: 30.1 PG (ref 27–33)
MCHC RBC AUTO-ENTMCNC: 35 GM/DL (ref 32–35)
MCV RBC AUTO: 85.8 FL (ref 84–97)
MONOCYTES # BLD AUTO: 0.87 X10(3)/MCL
MONOCYTES NFR BLD AUTO: 8 %
NEUTROPHILS # BLD AUTO: 6 X10(3)/MCL
NEUTROPHILS NFR BLD AUTO: 54.8 %
PLATELET # BLD AUTO: 475 X10(3)/MCL (ref 151–368)
PMV BLD AUTO: 8 FL
POTASSIUM SERPL-SCNC: 4.3 MMOL/L (ref 3.5–5.1)
RBC # BLD AUTO: 3.66 X10(6)/MCL (ref 4.3–5.6)
SODIUM SERPL-SCNC: 132 MMOL/L (ref 136–145)
WBC # SPEC AUTO: 10.93 X10(3)/MCL (ref 3.4–9.2)

## 2019-01-31 ENCOUNTER — HISTORICAL (OUTPATIENT)
Dept: LAB | Facility: HOSPITAL | Age: 54
End: 2019-01-31

## 2019-01-31 LAB
ABS NEUT (OLG): 3.98 X10(3)/MCL (ref 2.1–9.2)
ALBUMIN SERPL-MCNC: 3.3 GM/DL (ref 3.4–5)
ALBUMIN/GLOB SERPL: 0.6 RATIO (ref 1.1–2)
ALP SERPL-CCNC: 151 UNIT/L (ref 45–117)
ALT SERPL-CCNC: 32 UNIT/L (ref 12–78)
AMPHET UR QL SCN: POSITIVE
AST SERPL-CCNC: 23 UNIT/L (ref 15–37)
BARBITURATE SCN PRESENT UR: NEGATIVE
BASOPHILS # BLD AUTO: 0.06 X10(3)/MCL
BASOPHILS NFR BLD AUTO: 1 %
BENZODIAZ UR QL SCN: NEGATIVE
BILIRUB SERPL-MCNC: 0.3 MG/DL (ref 0.2–1)
BILIRUBIN DIRECT+TOT PNL SERPL-MCNC: 0.1 MG/DL
BILIRUBIN DIRECT+TOT PNL SERPL-MCNC: 0.2 MG/DL
BUN SERPL-MCNC: 15 MG/DL (ref 7–18)
CALCIUM SERPL-MCNC: 8.6 MG/DL (ref 8.5–10.1)
CANNABINOIDS UR QL SCN: NEGATIVE
CD3+CD4+ CELLS # SPEC: 1708 UNIT/L (ref 589–1505)
CD3+CD4+ CELLS NFR BLD: 49 % (ref 31–59)
CHLORIDE SERPL-SCNC: 103 MMOL/L (ref 98–107)
CO2 SERPL-SCNC: 26 MMOL/L (ref 21–32)
COCAINE UR QL SCN: NEGATIVE
CREAT SERPL-MCNC: 0.8 MG/DL (ref 0.6–1.3)
EOSINOPHIL # BLD AUTO: 0.27 X10(3)/MCL
EOSINOPHIL NFR BLD AUTO: 3 %
ERYTHROCYTE [DISTWIDTH] IN BLOOD BY AUTOMATED COUNT: 13.3 % (ref 11.5–14.5)
ETHANOL SERPL-MCNC: 8 MG/DL
GLOBULIN SER-MCNC: 5.3 GM/ML (ref 2.3–3.5)
GLUCOSE SERPL-MCNC: 319 MG/DL (ref 74–106)
HAV AB SER QL IA: NONREACTIVE
HBV CORE AB SERPL QL IA: NONREACTIVE
HBV SURFACE AB SER-ACNC: 4.73 MIU/ML
HBV SURFACE AB SERPL IA-ACNC: NONREACTIVE M[IU]/ML
HBV SURFACE AG SERPL QL IA: NEGATIVE
HCT VFR BLD AUTO: 35.9 % (ref 40–51)
HGB BLD-MCNC: 12.2 GM/DL (ref 13.5–17.5)
IMM GRANULOCYTES # BLD AUTO: 0.04 10*3/UL
IMM GRANULOCYTES NFR BLD AUTO: 0 %
LYMPHOCYTES # BLD AUTO: 3.44 X10(3)/MCL
LYMPHOCYTES # BLD AUTO: 3486 UNIT/L (ref 1260–5520)
LYMPHOCYTES NFR BLD AUTO: 42 % (ref 13–40)
LYMPHOCYTES NFR LN MANUAL: 42 % (ref 28–48)
LYMPHOMA - T-CELL MARKERS SPEC-IMP: ABNORMAL
MCH RBC QN AUTO: 29.2 PG (ref 26–34)
MCHC RBC AUTO-ENTMCNC: 34 GM/DL (ref 31–37)
MCV RBC AUTO: 85.9 FL (ref 80–100)
MONOCYTES # BLD AUTO: 0.48 X10(3)/MCL
MONOCYTES NFR BLD AUTO: 6 % (ref 4–12)
NEUTROPHILS # BLD AUTO: 3.98 X10(3)/MCL
NEUTROPHILS NFR BLD AUTO: 48 X10(3)/MCL
OPIATES UR QL SCN: POSITIVE
PCP UR QL: NEGATIVE
PH UR STRIP.AUTO: 6 [PH] (ref 5–8)
PLATELET # BLD AUTO: 447 X10(3)/MCL (ref 130–400)
PMV BLD AUTO: 8.5 FL (ref 7.4–10.4)
POTASSIUM SERPL-SCNC: 4.2 MMOL/L (ref 3.5–5.1)
PROT SERPL-MCNC: 8.6 GM/DL (ref 6.4–8.2)
RBC # BLD AUTO: 4.18 X10(6)/MCL (ref 4.5–5.9)
SODIUM SERPL-SCNC: 137 MMOL/L (ref 136–145)
TEMPERATURE, URINE (OHS): 25 DEGC (ref 20–25)
WBC # BLD AUTO: 8300 /MM3 (ref 4500–11500)
WBC # SPEC AUTO: 8.3 X10(3)/MCL (ref 4.5–11)

## 2019-03-14 ENCOUNTER — HISTORICAL (OUTPATIENT)
Dept: LAB | Facility: HOSPITAL | Age: 54
End: 2019-03-14

## 2019-03-14 LAB
AMPHET UR QL SCN: NEGATIVE
BARBITURATE SCN PRESENT UR: NEGATIVE
BENZODIAZ UR QL SCN: NEGATIVE
CANNABINOIDS UR QL SCN: NEGATIVE
COCAINE UR QL SCN: NEGATIVE
OPIATES UR QL SCN: POSITIVE
PCP UR QL: NEGATIVE
PH UR STRIP.AUTO: 6.5 [PH] (ref 5–8)
TEMPERATURE, URINE (OHS): 24 DEGC (ref 20–25)

## 2019-12-24 ENCOUNTER — HISTORICAL (OUTPATIENT)
Dept: LAB | Facility: HOSPITAL | Age: 54
End: 2019-12-24

## 2019-12-24 LAB
ABS NEUT (OLG): 5.4
ALBUMIN SERPL-MCNC: 3.1 GM/DL (ref 3.5–5.2)
ALBUMIN/GLOB SERPL: 0.7 RATIO (ref 1.1–2)
ALP SERPL-CCNC: 118 UNIT/L (ref 40–150)
ALT SERPL-CCNC: 9 UNIT/L (ref 0–55)
AST SERPL-CCNC: 9 UNIT/L (ref 5–34)
BASOPHILS # BLD AUTO: 0.02 X10(3)/MCL
BASOPHILS NFR BLD AUTO: 0.2 %
BILIRUB SERPL-MCNC: 0.4 MG/DL
BILIRUBIN DIRECT+TOT PNL SERPL-MCNC: 0.2 MG/DL
BILIRUBIN DIRECT+TOT PNL SERPL-MCNC: 0.2 MG/DL (ref 0–0.5)
BUN SERPL-MCNC: 15 MG/DL (ref 8.4–25.7)
CALCIUM SERPL-MCNC: 8.6 MG/DL (ref 8.4–10.2)
CHLORIDE SERPL-SCNC: 98 MMOL/L (ref 98–107)
CHOLEST SERPL-MCNC: 262 MG/DL
CHOLEST/HDLC SERPL: 7 {RATIO} (ref 0–5)
CO2 SERPL-SCNC: 27 MEQ/L (ref 22–29)
CREAT SERPL-MCNC: 0.93 MG/DL (ref 0.73–1.18)
EOSINOPHIL # BLD AUTO: 0.13 X10(3)/MCL
EOSINOPHIL NFR BLD AUTO: 1.3 %
ERYTHROCYTE [DISTWIDTH] IN BLOOD BY AUTOMATED COUNT: 15 %
ESTRADIOL SERPL HS-MCNC: <11 PG/ML
FSH SERPL-ACNC: 58 MIU/ML (ref 0.7–10.8)
GLOBULIN SER-MCNC: 4.6 GM/DL (ref 2.4–3.5)
GLUCOSE SERPL-MCNC: 147 MG/DL (ref 74–100)
HCT VFR BLD AUTO: 41.1 % (ref 39–49)
HDLC SERPL-MCNC: 40 MG/DL
HGB BLD-MCNC: 14.1 GM/DL (ref 12.6–16.6)
IMM GRANULOCYTES # BLD AUTO: 0.03 10*3/UL (ref 0–0.1)
IMM GRANULOCYTES NFR BLD AUTO: 0.3 % (ref 0–1)
LDLC SERPL CALC-MCNC: 176 MG/DL (ref 50–140)
LH SERPL-ACNC: 1.8 MIU/ML (ref 1.2–10.6)
LYMPHOCYTES # BLD AUTO: 3.87 X10(3)/MCL
LYMPHOCYTES NFR BLD AUTO: 38.3 %
MCH RBC QN AUTO: 29 PG (ref 27–33)
MCHC RBC AUTO-ENTMCNC: 34.3 GM/DL (ref 32–35)
MCV RBC AUTO: 84.6 FL (ref 84–97)
MONOCYTES # BLD AUTO: 0.66 X10(3)/MCL
MONOCYTES NFR BLD AUTO: 6.5 %
NEUTROPHILS # BLD AUTO: 5.4 X10(3)/MCL
NEUTROPHILS NFR BLD AUTO: 53.4 %
PLATELET # BLD AUTO: 480 X10(3)/MCL (ref 140–450)
PMV BLD AUTO: 8 FL
POTASSIUM SERPL-SCNC: 3.4 MMOL/L (ref 3.5–5.1)
PROLACTIN LEVEL (OHS): 94.2 NG/ML (ref 2.5–17.4)
PROT SERPL-MCNC: 7.7 GM/DL (ref 6.4–8.3)
PSA SERPL-MCNC: 0.34 NG/ML
RBC # BLD AUTO: 4.86 X10(6)/MCL (ref 4.3–5.6)
SODIUM SERPL-SCNC: 137 MMOL/L (ref 136–145)
T3FREE SERPL-MCNC: 1.82 PG/ML (ref 2.18–3.98)
T4 SERPL-MCNC: 6.06 UG/DL (ref 4.87–11.72)
TRIGL SERPL-MCNC: 228 MG/DL (ref 34–140)
TSH SERPL-ACNC: 5.67 UIU/ML (ref 0.35–4.94)
VLDLC SERPL CALC-MCNC: 46 MG/DL
WBC # SPEC AUTO: 10.11 X10(3)/MCL (ref 3.4–9.2)

## 2020-06-09 ENCOUNTER — HISTORICAL (OUTPATIENT)
Dept: ADMINISTRATIVE | Facility: HOSPITAL | Age: 55
End: 2020-06-09

## 2020-06-17 ENCOUNTER — HISTORICAL (OUTPATIENT)
Dept: WOUND CARE | Facility: HOSPITAL | Age: 55
End: 2020-06-17

## 2020-06-30 LAB — HBA1C MFR BLD: 9.1 % (ref 4–6)

## 2020-07-09 LAB — GRAM STN SPEC: NORMAL

## 2020-07-12 LAB — FINAL CULTURE: NORMAL

## 2022-04-10 ENCOUNTER — HISTORICAL (OUTPATIENT)
Dept: ADMINISTRATIVE | Facility: HOSPITAL | Age: 57
End: 2022-04-10

## 2022-04-27 VITALS
DIASTOLIC BLOOD PRESSURE: 81 MMHG | SYSTOLIC BLOOD PRESSURE: 112 MMHG | HEIGHT: 73 IN | BODY MASS INDEX: 23.23 KG/M2 | WEIGHT: 175.25 LBS | OXYGEN SATURATION: 98 %

## 2022-04-30 NOTE — H&P
SOCIAL SECURITY #:      :  1965    CHIEF COMPLAINT:  Extension of the gangrenous process of the sole of right foot as well as right index finger.    HISTORY OF PRESENT ILLNESS:  This 53-year-old male patient is known to me for the last few weeks because of severe infection and gangrenous changes of skin and soft tissue of the right foot as well as gangrenous changes of skin, soft tissue and bone of the right index finger.  The patient has extension of the gangrenous changes at present, hence he is brought in for further debridement.       He has multiple medical problems including diabetes mellitus, hypertensive cardiovascular disease, hepatitis C, HIV infection, chronic obstructive pulmonary disease, anemia, peripheral neuropathy, posttraumatic stress disorder, bipolar disease.  He is on multiple medications.       He had debridement of the right foot done by Dr. Clare Hicks in [place] a few weeks back and subsequently referred to [facility] for swing bed care.  He was getting swing bed care.  During that period of time the patient was referred to me because of extension of gangrenous changes.  He had several surgeries in the past including right orchiectomy and cervical neurectomy, carpal tunnel release, cholecystectomy, knee replacement.  He has a history of polysubstance abuse, also gastroesophageal reflux disease, osteoarthritis, HIV infection, etc.    SOCIAL HISTORY:  He is a heavy smoker.  He use to be on illicit drugs including marijuana and methamphetamine, etc.    FAMILY HISTORY:  Multiple medical problems including hypertensive cardiovascular disease, diabetes mellitus, malignancy, etc.    REVIEW OF SYSTEMS:  RESPIRATORY:  He has chronic obstructive pulmonary disease.  CARDIOVASCULAR:  Known to have hypertension.  ENDOCRINE:  He has diabetes mellitus.  MUSCULOSKELETAL:  He has joint problems.  GENITOURINARY:  He underwent right-sided orchiectomy.  HEMATOLOGIC:  Bleeds easily.   PSYCHIATRIC:  Has psychosis including bipolar disease, depression, anxiety.  NEUROLOGIC:  No complaints.  GASTROINTESTINAL:  He has gastroesophageal reflux disease.  GENERAL:  The patient has soft tissue gangrene of the right foot and extension of the gangrenous changes of the right index finger also.      PHYSICAL EXAMINATION:  GENERAL:  Condition of the patient is fair, better than earlier time.   HEENT:  No scalp lesion.  Oral cavity and throat normal.     NECK:  Thyroid, no nodules.  Trachea central.  No carotid bruit.  Jugular venous pressure normal.  No cervical or supraclavicular lymph nodes not enlarged.   CHEST:  Lungs with air entry equal on both sides.  No rales or wheezing.   HEART:  Regular.  No murmur.     ABDOMEN:  Soft.  No tenderness.  No masses palpable.  Good bowel sounds.  Hernia sites normal.     :  Right testis absent.  Epididymis is normal.   EXTREMITIES:  Femoral and popliteal pulses palpable.  Pedal pulses not felt.  Extension of the gangrenous on the sole of the foot.  Multiple ulcerated areas of the foot.  The right index finger has extension of the gangrene of the soft tissue.  Open wound on the finger also noted.   NEUROLOGIC:  He has peripheral neuropathy.     SPINE:  No tenderness noted.    CLINICAL IMPRESSION:    1. Wet gangrene of the sole of the right foot.    2. Wet gangrene of the volar aspect of the right index finger.      PLAN:  The patient is scheduled for debridement of the soft tissue gangrene of the right foot and right index finger.  Procedure well explained to the patient.  Questions were answered to his satisfaction.  He agreed for the surgery.  Possible complications were well understood, discussed several times.        ______________________________  MD TO Harkins/ELADIO  DD:  08/23/2018  Time:  04:49AM  DT:  08/23/2018  Time:  05:45AM  Job #:  [id number]    The H&P was reviewed, the patient was examined, and the following changes to the patients   condition are  noted:  _____________________________________________________________________________  ______________________________________________________________________________  ______________________________________________________________________________  [  ]  No changes to the patient's condition:      ______________________________                                             ___________________  PHYSICIAN SIGNATURE                                                             DATE/TIME    cc: Christopher __________   Feliberto Danish, MD      Jennifer/Anh

## 2022-04-30 NOTE — OP NOTE
DATE OF SURGERY:    08/23/2018    SURGEON:  Feliberto Peng MD    PREOPERATIVE DIAGNOSIS:    1. Wet gangrene of the soft tissue of the sole of right foot.    2. Wet gangrene of the dorsal aspect of the index finger of right side.    POSTOPERATIVE DIAGNOSES:    1. Wet gangrene of the soft tissue of the sole of right foot.    2. Wet gangrene of the dorsal aspect of the index finger of right side.    OPERATION PERFORMED:    1. Excision of the necrotic plantar aponeurosis and skin and subcutaneous tissue from the sole of right foot.    2. Debridement of the gangrenous soft tissue from the dorsal aspect of the right index finger.    ANESTHESIA:  Local MAC.    PROCEDURE IN DETAIL:  This 53-year-old male patient was brought to the operating room, placed in supine position.  IV sedation was given.  Right hand, as well as the right foot were prepared and draped in the usual fashion.  Initially, I explored the right foot wound.  The patient had an extension of the necrotic plantar fascia noted.  A large piece of tissue measuring 10 cm x 4 cm size was excised.  This was completely necrotic.  There was a skin bridge covering the area that was transected.  The necrotic skin and subcutaneous tissue were also removed.  Hemostasis was obtained with diathermy.  Wound thoroughly irrigated with warm saline.  Subsequently, wound dressing applied.  During the procedure, 1% Xylocaine anesthesia was infiltrated for the analgesia.  Subsequently, the right index finger was explored.  A digital block was given with 1% Xylocaine anesthesia.  The necrotic tissue on the dorsal aspect, which was mostly necrotic subcutaneous tissue, was curetted out.  The distal portion of the bone was exposed.  Hemostasis was obtained with diathermy and wound irrigated with normal saline.  Wet-to-dry dressing applied.  He tolerated the procedure well.  Currently, he received 900 mg of clindamycin prior to surgery.  He was transferred back to his room in good  condition.  He will be going back on swing bed service.        ______________________________  MD TO Harkins/EFREN  DD:  08/23/2018  Time:  09:38PM  DT:  08/23/2018  Time:  09:56PM  Job #:  [id number]    cc: MD Aubrey Harkins MD

## 2022-04-30 NOTE — CONSULTS
"   Patient:   Hakeem Mcdowell            MRN: [id number]            FIN: [id number]               Age:   53 years     Sex:  Male     :  1965   Associated Diagnoses:   Moderate depressed bipolar I disorder   Author:   Shelbie Agarwal MD      History of Present Illness   52y/o AAM with h/o bipolar d/o admitted to skilled unit for wound care and IV abx, called to see pt at his request due to increasing depression.  Pt reports that he has been getting more depressed over the past 6 months since brother .  He reports crying spells, disrupted sleep, sad mood, worthlessness, poor appetite, denies any psychosis, denies any SI or HI.  He reports that he did better when on cymbalta in the past.  Feels it worked better than the effexor.    PPsychHx:  inpt stays, PHP, IOP all for bipolar d/o, diagnosed about 20y ago after he stopped "drinking and drugging."  Has been followed by ARELY Rai NP for years but more recently by Dr. Cruz  FamHx:  n/a  MedHx:    Acid reflux   Anemia   Anxiety   Bipolar   Chronic disease-related malnutrition    Chronic neck pain   COPD (chronic obstructive pulmonary disease)   Diabetes mellitus type II   Hepatitis C   HIV (human immunodeficiency virus infection)   HYPERTENSION   Impaired mobility   Neuropathy   Osteoarthritis of right knee joint   Other synovitis and tenosynovitis, right hand   Polysubstance abuse   Pressure ulcer stage 1   Pressure ulcer stage 2    PTSD (post-traumatic stress disorder)   Tobacco user          Assessment and Plan   Mood:  Depressed, Sad, Anxious.    Thought Process:  Appropriate.    Delusions:  Not present.    Speech:  Soft.    Attitude:  Cooperative.    Affect:  Appropriate.    Appearance:  Appropriate.    Hallucinations:  Not present.    Motor Activity:  Hypoactive.    Attention/Concentration:  Intact.    Judgement:  Intact.    Orientation:  Time, Place, Person, Situation.    Memory:  Recent intact, Remote intact.    Abstract Thinking:  Age " appropriate.    Homicidality:  Not present.    Suicidality:  Not present.       Impression and Plan   Diagnosis     Moderate depressed bipolar I disorder (CIF42-II F31.32).     Plan:  Taper Effexor (educated pt about discontinuation syndrome) over 2-4 weeks as tolerated  Start Cymbalta 30mg po bid, increase to 60mg po bid if still depressed in 2 weeks  Change Latuda to with dinner (poor absorption if given without meal)  Increase Seroquel.    Patient Instructions:       Counseled: Patient, Regarding diagnosis, Regarding treatment, Regarding medications.

## 2022-04-30 NOTE — OP NOTE
DATE OF SURGERY:    08/15/2018    SURGEON:  Feliberto Peng MD    PREOPERATIVE DIAGNOSES:    1. Skin and soft-tissue gangrene of the sole of right foot.    2. Soft-tissue gangrene of the distal portion of the right index finger with a subungual abscess.    POSTOPERATIVE DIAGNOSES:    1. Soft-tissue gangrene on the sole of right foot including plantar aponeurosis, flexor tendons and muscles.    2. Soft-tissue gangrene of the distal portion of the right index finger with osteomyelitis of the distal phalanx.    3. Subungual abscess of the right index finger with extension to the proximal portion of the dorsum of the right index finger with extensor tendon sheath infection.    ANESTHESIA:  General.    OPERATION PERFORMED:    1. Debridement of the skin and subcutaneous tissue of plantar aponeurosis, flexor tendons and muscles of the plantar aspect of the right foot.    2. Amputation of the distal portion of the right index finger.    3. Drainage of the abscess of the dorsal aspect of the right index finger.    PROCEDURE IN DETAIL:  This 53-year-old male patient who was on swing bed in the hospital, was transferred to outpatient ambulatory surgery service today for procedures of debridement, drainage.  Initially the patient was brought to the operating room, placed in supine position.  General anesthesia was given.  Right foot and right leg were prepared and draped in the usual fashion.  Debridement of the sole of the foot was initially done.  The patient had 2 large open areas on the midportion of the sole of the foot and also has another open area with necrotic tissue on the lateral aspect of the right foot.  Initially the sole of the foot wound, skin was thickened and skin was excised, and on the deeper portion of the wound the necrotic plantar aponeurosis was well seen.  This was also excised.  Hemostasis was obtained with diathermy.  Some flexor tendons were necrotic and these were also excised.  Hemostasis was  obtained with diathermy.  Two of the wounds which were adjacent to each other were communicating each other with a skin bridge.  Following excision of these both areas the wounds were thoroughly irrigated with warm saline.  Hemostasis was obtained with diathermy, and these wounds measured altogether 10 x 5 cm in size.  Subsequently the wound on the lateral aspect of the right foot was debrided, that also had a lot of necrotic tissue present.  This was excised.  Most of the necrotic tissue involved the skin and subcutaneous tissue.  Following excision hemostasis was obtained with diathermy.  The wound again irrigated with warm saline, and all of these 3 wounds; 2 on the plantar aspect, 1 on the lateral aspect of the foot were packed with Betadine soaked gauze.  The lateral wound measured 3.5 cm x 3 cm in size, quite deep also.  Dressing applied, and following this the right hand was prepared and draped in the usual fashion.  The patient has thick necrotic tissue on the tip of the finger.  This was excised initially.  On excision it was noted the purulent material was pouring out.  There was a subungual infection noted.  On removing the nail, I was able to identify the necrotic bone which was completely infected of the distal phalanx.  I removed the germinal epithelium for the nail and subsequently the bone was transected at the interphalangeal joint area with bone rongeurs.  Hemostasis was obtained with diathermy.  On further examination, it was noted pus was pouring out through the wound from the proximal portion, hence I ended up extending the incision on the dorsal aspect in line of the extensor tendon.  The tendon sheath was inspected, the pus was coming out from the tendon sheath.  This was all the way open to the proximal portion of the finger, and hemostasis was obtained with diathermy.  Wound thoroughly irrigated with warm saline.  After obtaining hemostasis, the wound packed with iodoform gauze.  Dressing  applied.  He tolerated the procedure well.  He was transferred to recovery room in good condition.        ______________________________  MD TO Harkins/ELSA  DD:  08/15/2018  Time:  10:21PM  DT:  08/15/2018  Time:  10:59PM  Job #:  [id number]    cc: MD Aubrey Harkins MD

## 2022-04-30 NOTE — CONSULTS
REASON FOR CONSULTATION:  Evaluation of gangrenous wounds of the right foot and right index finger.    CLINICAL EVALUATION AND SUGGESTED TREATMENT:  This 53-year-old male patient was originally admitted to [facility] by Dr. Aubrey Hu from the emergency room because of redness and swelling of the right foot with a large wound on the plantar aspect of the foot.  Apparently, he stepped on a bottle cap while walking barefoot.  He subsequently got a severe infection, and he was consulted to Dr. Clare Hicks, general surgeon at [facility], who has performed debridement of the wounds.  It was described he had a large plantar wound, extending about 7 x 9 cm, with fat necrosis.  It was dissected down to the underlying fascia and tendinous structures of the foot.  Wound dressing was done.  Subsequently, the patient was referred to Dr. Baltazar Rico, Orthopedics, regarding the infection of the right index finger.  He has performed I and D of the right index finger apparently.  Currently, he has done incision and drainage and debridement of the infected right index finger, and also, incision and drainage of the abscess of the dorsum of the middle finger was done.  Subsequently, the patient was admitted to [facility] apparently on 2018, and subsequently was transferred to a swing bed for long-term care.  The patient has multiple medical problems, including chronic anemia, bipolar disease, chronic obstructive pulmonary disease, depression, diabetes mellitus, hypertensive cardiovascular disease, hepatitis C, HIV infection, and diabetic peripheral neuropathy with severe deformity of the fingers.  He had multiple drainages of infected areas of the extremities in the past.    MEDICATIONS:  He is on multiple medications at home, includin. Norco  10.  2. Xanax.  3. Cipro.  4. Cyclobenzaprine.  5. Gabapentin.  6. Ibuprofen.  7. Insulin.  8. Latuda.  9. Meloxicam.  10. NovoLog.  11. Omeprazole.  12. Quetiapine.  13. Seroquel.  14. Tramadol.  15. Venlafaxine.  16. Zolpidem.  17. Etc.    PAST MEDICAL/SURGICAL HISTORY:  He has chronic neck pain.  He had a carpal tunnel release done in the past, knee replacement, and right-sided orchiectomy.  The chart says he had cholecystectomy, but the patient does not disclose that to me.    SOCIAL HISTORY:  He is a heavy smoker and smokes over a pack of cigarettes daily.  He used to use illicit drugs, including marijuana, methamphetamine, and prescription drugs.  He had a long history of drug abuse and also tobacco abuse.  He did not recall he had any blood transfusions in the past.    FAMILY HISTORY:  Hypertension, malignancy, and hyperlipidemia in both of his parents.    EXAMINATION:  GENERAL:  Condition of the patient is fair.  A middle-aged man, very thin built, basically emaciated.  Not dehydrated.  Not icteric.   HEENT:  No scalp lesion.  Oral cavity and throat normal.   NECK:  Supple.  Thyroid:  No nodules.  Trachea central.  No cervical or supraclavicular lymphadenopathy.  No carotid bruits.  Jugular venous pressure not engorged..   CHEST:  Air entry equal on both sides.  No rales or wheezing.   HEART:  Regular.  No murmur.  No gallop.   ABDOMEN:  Soft.  No tenderness.  No masses palpable.   GENITALIA:  Inguinal areas normal.  No hernia noted.  Left testis palpable.  Right testis absent.  Penis is normal.   EXTREMITIES:  Femoral and popliteal pulses are present.  Pedal pulses not felt.  The right foot has severe gangrenous changes noted on the sole of the foot.  Multiple areas of ulceration with necrotic soft tissue noted.  The depth of this is not certain.  It could very well involve all the way to the tendons and deeper tissues.  In the upper extremities, the right index finger has severe infection with purulent  drainage from the distal portion.  There is subungual infection.  Purulent material is extruding out from the volar as well as dorsal aspect of the finger.  Both hands and fingers are deformed.  He states this is because of peripheral neuropathy.  He is unable to use the hands on both sides well.  There is muscle wasting present.  He is unable to flex the fingers.  Severe arthritic changes are present.   SPINE:  Normal.   NEUROLOGIC STATUS:  The patient has severe peripheral neuropathy.    LAB DATA:  From earlier, the patient's electrolytes showed sodium was low.  Blood glucose was quite elevated at 234.  The CBG examination done periodically. Blood pressure 106/160.  CBC showed his hematocrit was low.  Latest hematocrit done on 08/12, was 29.1.  White cell count had no elevation noted.  X-ray of the chest done on 07/22, showed a PICC line.  There was infiltrate or atelectasis seen in the lingular segment of the left upper lobe.      IMPRESSION:    1. 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  2. Soft tissue infection of the right index finger with subungual abscess and wet gangrene of the distal portion of the right index finger.    PLAN:  The patient needs extensive debridement of these areas under anesthesia.  I will be able to re-evaluate his foot and finger under anesthesia and perform the appropriate debridement.  I explained the same to the patient.  He understood.  There was no family member available.  He understands his condition is quite serious, and he could very well lose part of the extremities.  He already has one of the fingers missing on the right hand.  The surgical procedure was well explained.  Questions were answered to his satisfaction.  Possible complications were also discussed in detail, including bleeding, infection, recurrence of the infection, extension of the infection resulting in amputation, septicemia, shock, thromboembolic complication, respiratory complication,  cardiac arrhythmia, cardiac arrest, cerebrovascular accident, drug reactions, also nonhealing wound, prolonged wound care, and institutionalization for the wound care etc were explained.  He accepted reasonable  risks  and agreed for surgery.  We will proceed with surgical debridement tomorrow on 08/15/2018.        ______________________________  MD TO Harkins/ELSA  DD:  08/15/2018  Time:  05:07AM  DT:  08/15/2018  Time:  06:14AM  Job #:  [id number]    cc: MD Asif Harkins III, md Dr. Christopher Novak

## 2022-05-03 NOTE — HISTORICAL [facility] CERNER
This is a historical note converted from Nicolas. Formatting and pictures may have been removed.  Please reference Nicolas for original formatting and attached multimedia. Admission Information  53-year-old male?discharge from?[facility] for?SNF to complete his IV antibiotic course?and wound care.??He had a diabetic wound?that grew out Klebsiella. ?He was status post?I&D by orthopedic surgery and it?was recommended that?he obtain 2 weeks worth of IV antibiotics.  Hospital Course  53-year-old male?admitted for long-term IV antibiotics and wound care. ?He was started on?IV ciprofloxacin?due to the wound culture from?his admitted at the hospital. ?He was eventually?started on clindamycin?per general surgery?who?was consulted?for wound care. ?Patient had multiple debridements?during his stay.??Initially wound debridements had to be extended to be amputation of the distal?part of the?right index finger. ?Cultures obtained after admission to be the wound care?ended up being negative?patient completed his 14 day course of IV clindamycin.? General surgery eventually?start patient on a?wound VAC.? Patient?had?a rough time dealing with his chronic pain?so his pain medications were adjusted throughout his admission?to ensure?optimal relief. ?Patient was also evaluated by psychiatry and diagnosed with?bipolar 1 disorder with moderate depression.? He was started on Cymbalta, his Seroquel was increased?and his Latuda?was?switched to dinnertime. ?His chronic medical problems?was controlled with his home medications during her stay. ?After patient completed his?IV antibiotic?course?and general surgery signed off for wound care?he was transferred to [facility] in?stable condition?with instructions for proper wound care?and follow-up?appointments.  Procedures and Treatment Provided  ?Amputation of the distal portion of the right index finger. ?See operative note for full details  Physical Exam  Vitals & Measurements  T:?36.7? ?C  (Oral)? TMIN:?36.4? ?C (Oral)? TMAX:?36.9? ?C (Oral)? HR:?94(Peripheral)? HR:?88(Monitored)? RR:?20? BP:?108/78? SpO2:?98%?  General: Alert, oriented, thin, cachectic, NAD. ?Comfortable.  HEENT: NCAT. PERRL. OP clear. MMM, poor dentition  Respiratory: Symmetrical chest wall rise and fall. CTAB. No rhonchi, wheezes, crackles  Cardiovascular: RRR. No MRG. pulses 2+ bilaterally, cap refill < 2 sec  Gastrointestinal: Soft, NT, BS +, no appreciable organomegaly  : No CVA or suprapubic tenderness  Skin: Pink, warm, no rash?  Neurological: AOx4, follows commands, no focal deficits  Extremities: Right hand dressing C/D/I. ?Right foot dressing?C/D/I. ?Right foot disconnected from wound VAC  Discharge Plan  1.?Diabetic wet gangrene of the foot  2.?Open wound of right index finger with damage to nail  3.?Hyponatremia  4.?Anemia of chronic disease  5.?Chronic pain  6.?Essential (primary) hypertension  7.?Moderate depressed bipolar I disorder  8.?Neuropathy  9.?Pressure ulcer stage 2  10.?Tobacco user  11.?Polysubstance abuse  Orders:  alPRAzolam, 1 mg = 1 tab(s), Oral, BID, PRN PRN anxiety, 0 Refill(s)  docusate, 100 mg = 1 cap(s), Oral, BID, 0 Refill(s)  DULoxetine, 30 mg = 1 cap(s), Oral, BID, 0 Refill(s)  gabapentin, 900 mg = 3 cap(s), Oral, TID, # 270 cap(s), 0 Refill(s)  lurasidone, 80 mg = 2 tab(s), Oral, With Supper, 0 Refill(s)  lurasidone, 80 mg = 1 tab(s), Oral, qPM, # 30 tab(s), 0 Refill(s)  morphine, 15 mg = 1 tab(s), Oral, q12hr, # 60 tab(s), 0 Refill(s)  nicotine, TD, Daily, 0 Refill(s)  QUEtiapine, Oral, At Bedtime, 0 Refill(s)  Patient Discharge Condition  Stable  Discharge Disposition  Nursing home

## 2022-05-03 NOTE — HISTORICAL [facility]
This is a historical note converted from Nicolas. Formatting and pictures may have been removed.  Please reference Nicolas for original formatting and attached multimedia. History of Present Illness  53-year-old male?discharge from?[facility] for?SNF to complete his IV antibiotic course?and wound care.??He had a diabetic wound?that grew out Klebsiella. ?He was status post?I&D by orthopedic surgery and it?was recommended that?he obtain 2 weeks worth of IV antibiotics.  Review of Systems  CONSTITUTIONAL: No weight loss, fever, chills, weakness or fatigue  EYES: No diplopia or blurred vision.  ENT: No earache, sore throat or runny nose.  CARDIOVASCULAR: No pressure, squeezing, strangling, tightness, heaviness or aching about the chest, neck, axilla or epigastrium.  RESPIRATORY: No cough, shortness of breath, PND or orthopnea.  GASTROINTESTINAL: No nausea, vomiting or diarrhea.  GENITOURINARY: No dysuria, frequency or urgency.  MUSCULOSKELETAL: +Chronic?extremity pain?from his?contractures?in his hands.  SKIN: + Chronic diabetic?wounds in bilateral feet  NEUROLOGIC: + chronic neuropathy of feet  PSYCHIATRIC: No disorder of thought or mood.  ENDOCRINE: No heat or cold intolerance, polyuria or polydipsia.  HEMATOLOGICAL: No easy bruising or bleeding.  Physical Exam  Vitals & Measurements  WT:?85.729?kg?  GEN: Pt alert, thin, cachectic. ?Sitting up in bed, pleasant  EYES: EOMI, No scleral icterus  HENT: NCAT, Oral mucosa moist, Neck supple, poor dentition  CV: Regular rate and rhythm, No murmur, rub, or gallop appreciated. Peripheral pulses palpable.  RESP: Unlabored breathing, symmetrical chest rise and fall, CTA bilaterally, no wheeze, rhonchi, or crackles  GI: Bowel sounds normoactive, soft, flat, non-tender, non-distended, No hepatosplenomegaly  : No cva tenderness to palpation, no suprapubic tenderness to palpation  MSK/Extremities: Chronic contractures?and limited range of motion of both hands. Amputation of multiple  toes.  NEURO: AAOx3, CN 2-12 grossly intact, Strength grossly intact, sensation grossly intact  SKIN: Right hand dressing C/D/I. ?Bilateral ankle ulcers also dressed and C/D/I  PSYCH: Mood normal, Affect normal, calm, co-operative with exam.  Assessment/Plan  Ordered:  amLODIPine, 10 mg = 2 tab(s), Oral, Daily, 0 Refill(s)  Admit to Swing Bed, 08/07/18 15:55:00 CDT, Paola CHRISTIANSON, [facility], Yes  Blood Culture, 08/07/18 16:15:00 CDT, Now collect, Blood, PICC line, Stop date 08/07/18 16:20:00 CDT  Blood Culture, 08/07/18 16:16:00 CDT, Now collect, Blood, Stop date 08/07/18 16:20:00 CDT  Consult Case Management, 08/07/18 16:22:00 CDT, Other  Discharge, DC/Trans to [facility], Give all scheduled vaccinations prior to discharge.  Discharge Activity, Activity as Tolerated  Discharge Diet, Other:, Diabetic but high protein (i.e double meat, etc)  MD to Nurse, 08/07/18 14:42:00 CDT, Give all scheduled vaccinations prior to discharge.  Patient Isolation, 08/07/18 16:21:00 CDT, Contact Precautions, BID, CM Isolation  Peripheral IV Discontinue, 08/07/18 14:42:00 CDT  ?  1.??Abscess and cellulitis?of left foot?and right?index finger -status post I&D by [facility]?continue IV vancomycin IV ciprofloxacin for another 7 days. ?Cultures have been no growth to?date?so far.??Consult CM/SS for discharge planning. ?Vancomycin dosing per pharmacy  ?  2. ?Chronic diabetic wounds - Consult wound care  ?  3. ?Hypertension - Continue?amlodipine  ?  4.? Type 2 diabetes - Sliding scale insulin with Accu-Cheks,?Lantus nightly  ?  5.? Chronic pain due to neuropathy and contractures - Continue?gabapentin, cyclobenzaprine,?and?Percocet  ?  6.??Bipolar disorder - Continue Seroquel?and Latuda  ?   7.? HIV -stable, follow-up with ID as outpatient  ?   8.? Hepatitis C -stable,?follow-up with ID/GI as outpatient  ?   9.? Chronic malnutrition -nutrition consult  ?  ?  ?  ?  ?  ?  ?  ?  ?  ?   Problem List/Past Medical  History  Ongoing  Acid reflux  Anemia  Anxiety  Bipolar  Chronic disease-related malnutrition  Chronic disease-related malnutrition  Chronic neck pain  COPD (chronic obstructive pulmonary disease)  Depression  Diabetes mellitus type II  Hepatitis C  HIV  HIV (human immunodeficiency virus infection)  HYPERTENSION  Neuropathy  Osteoarthritis of right knee joint  Other synovitis and tenosynovitis, right hand  Pneumonia  Polysubstance abuse  Psychosis  PTSD (post-traumatic stress disorder)  Sinusitis  Tobacco user  Tobacco user  Historical  No qualifying data  Procedure/Surgical History  Drainage of Right Hand Skin, External Approach (07/25/2018)  Extraction of Right Finger Phalanx, Open Approach (07/25/2018)  Incision & Drainage (Right, Hand) (07/25/2018)  Debridement Foot (Right) (07/18/2018)  Excision of Right Foot Tendon, Open Approach (07/18/2018)  Excision of Right Hand Bursa and Ligament, Open Approach (02/05/2018)  Incision & Drainage Upper Extremity (Right) (02/05/2018)  Release Median Nerve, Open Approach (02/05/2018)  Detachment at Right Middle Finger, High, Open Approach (02/01/2018)  Incision & Drainage Upper Extremity (Right) (02/01/2018)  Debridement (eg, high pressure waterjet with/without suction, sharp selective debridement with scissors, scalpel and forceps), open wound, (eg, fibrin, devitalized epidermis and/or dermis, exudate, debris, biofilm), including topical application(s), wound (04/18/2017)  Extraction of Right Upper Leg Skin, External Approach (04/18/2017)  Insertion of Infusion Device into Superior Vena Cava, Percutaneous Approach (02/23/2017)  Ultrasonography of Superior Vena Cava, Guidance (02/23/2017)  Drainage of Right Knee Joint, Percutaneous Approach, Diagnostic (02/20/2017)  Respiratory Ventilation, 24-96 Consecutive Hours (02/18/2017)  Respiratory Ventilation, 24-96 Consecutive Hours (02/16/2017)  Insertion of Infusion Device into Right Internal Jugular Vein, Percutaneous Approach  (02/13/2017)  Respiratory Ventilation, 24-96 Consecutive Hours (02/13/2017)  Debridement, subcutaneous tissue (includes epidermis and dermis, if performed); first 20 sq cm or less (01/13/2017)  Excision of Left Hand Subcutaneous Tissue and Fascia, Open Approach (01/13/2017)  Debridement (eg, high pressure waterjet with/without suction, sharp selective debridement with scissors, scalpel and forceps), open wound, (eg, fibrin, devitalized epidermis and/or dermis, exudate, debris, biofilm), including topical application(s), wound (12/07/2016)  Extraction of Left Hand Skin, External Approach (12/07/2016)  Debridement (eg, high pressure waterjet with/without suction, sharp selective debridement with scissors, scalpel and forceps), open wound, (eg, fibrin, devitalized epidermis and/or dermis, exudate, debris, biofilm), including topical application(s), wound (11/28/2016)  Extraction of Left Hand Skin, External Approach (11/28/2016)  Debridement (eg, high pressure waterjet with/without suction, sharp selective debridement with scissors, scalpel and forceps), open wound, (eg, fibrin, devitalized epidermis and/or dermis, exudate, debris, biofilm), including topical application(s), wound (11/21/2016)  Extraction of Left Hand Skin, External Approach (11/21/2016)  Debridement (eg, high pressure waterjet with/without suction, sharp selective debridement with scissors, scalpel and forceps), open wound, (eg, fibrin, devitalized epidermis and/or dermis, exudate, debris, biofilm), including topical application(s), wound (11/14/2016)  Extraction of Left Hand Skin, External Approach (11/14/2016)  Excision of Left Hand Skin, External Approach (11/01/2016)  Incision & Drainage (Left) (11/01/2016)  Drainage of Right Upper Leg Skin, External Approach (12/31/2015)  Excision inferior turbinate, partial or complete, any method. (02/06/2015)  FESS Septo Turb Cautery (.) (02/06/2015)  Fracture of the turbinates (02/06/2015)  HOSPITAL  OBSERVATION SERVICE, PER HOUR (02/06/2015)  Intranasal antrotomy (02/06/2015)  Nasal/sinus endoscopy, surgical, with maxillary antrostomy;. (02/06/2015)  Nasal/sinus endoscopy, surgical; with reese bullosa resection. (02/06/2015)  Other turbinectomy (02/06/2015)  Incision and drainage of abscess (eg, carbuncle, suppurative hidradenitis, cutaneous or subcutaneous abscess, cyst, furuncle, or paronychia); simple or single. (03/02/2014)  Other incision with drainage of skin and subcutaneous tissue (03/02/2014)  Continuous invasive mechanical ventilation for less than 96 consecutive hours (04/30/2012)  cervical neurectomy  Cholecystectomy  CTR - Carpal tunnel release  Knee replacement  Orchiectomy  sinus   Medications  Inpatient  acetaminophen 325 mg oral tablet, 650 mg= 20.3 mL, Oral, q6hr, PRN  amLODIPine, 10 mg= 2 tab(s), Oral, Daily  Cipro, 400 mg= 200 mL, IV Piggyback, q12hr  cyclobenzaprine 10 mg oral tablet, 10 mg= 1 tab(s), Oral, BID  Dextrose 50% and Water (50 mL vial/syringe), 12.5 gm= 25 mL, IV Push, As Directed, PRN  Dextrose 50% and Water (50 mL vial/syringe), 12.5 gm= 25 mL, IV Push, Once, PRN  Dextrose 50% and Water (50 mL vial/syringe), 25 gm= 50 mL, IV Push, As Directed, PRN  Dextrose 50% in Water intravenous solution, 12.5 gm= 25 mL, IV Push, As Directed, PRN  enoxaparin 40 mg/0.4 mL subcutaneous solution, 40 mg= 0.4 mL, Subcutaneous, Daily  gabapentin 300 mg oral capsule, 300 mg= 1 cap(s), Oral, TID  glucagon, 1 mg= 1 EA, IM, q10min, PRN  glucagon, 1 mg= 1 EA, IM, q10min, PRN  insulin aspart-insulin aspart protamine, 10 units= 0.1 mL, Subcutaneous, Daily  insulin glargine 100 units/mL subcutaneous solution, 24 units= 0.24 mL, Subcutaneous, Daily  insulin lispro, 2-14 units, Subcutaneous, As Directed, PRN  Latuda 80mg tab, 80 mg, Oral, qPM  midodrine 5 mg oral tablet, 10 mg= 2 tab(s), Oral, TID  NS (0.9% Sodium Chloride) Infusion 1,000 mL, 1000 mL, IV  oxycodone 5 mg oral tablet, 10 mg= 2 tab(s), Oral,  q4hr, PRN  Protonix 40 mg ORAL enteric coated tablet, 40 mg= 1 tab(s), Oral, Daily  Seroquel 50 mg oral tablet, 200 mg= 2 tab(s), Oral, At Bedtime  Sodium Chloride 0.9% intravenous solution 1,000 mL, 1000 mL, IV  Vancomycin (for IVPB)  venlafaxine 150 mg oral capsule, extended release, 150 mg= 1 cap(s), Oral, Daily  Xanax 1 mg oral tablet, 1 mg= 1 tab(s), Oral, BID  Home  acetaminophen-oxycodone 325 mg-10 mg oral tablet, 1 tab(s), Oral, q4hr  ALPRAZOLAM TAB 2MG, 2 mg= 1 tab(s), Oral, qPM  amlodipine 5 mg oral tablet, 10 mg= 2 tab(s), Oral, Daily  BD Lactinex oral granule, 1 packet(s), Oral, TID  Cipro, 400 mg= 200 mL, IV, q12hr,? ?Not taking  CYCLOBENZAPR TAB 5MG, 5 mg= 1 tab(s), Oral, qPM  gabapentin 300 mg oral capsule, 300 mg= 1 cap(s), Oral, TID  ibuprofen 800 mg oral tablet, See Instructions, 1 refills,? ?Not Taking per Prescriber: TID as needed  insulin glargine 100 units/mL subcutaneous solution, 46 units, Subcutaneous, Daily  LATUDA TAB 80MG, 80 mg= 1 tab(s), Oral, qPM  meloxicam 15 mg oral tablet, 15 mg= 1 tab(s), Oral, Daily  MIDODRINE TAB 10MG, 10 mg= 1 tab(s), Oral, TID,? ?Not Taking per Prescriber  NovoLOG PenFill 100 units/mL subcutaneous solution, 15 units, Subcutaneous, TIDAC, 11 refills  OMEPRAZOLE CAP 20MG, 20 mg= 1 cap(s), Oral, Daily  OXYCODONE TAB 10MG  QUETIAPINE TAB 200MG  Seroquel 100 mg oral tablet, 200 mg= 2 tab(s), Oral, At Bedtime,? ?Not taking  Vancomycin (for IVPB), IV Piggyback, q10hr,? ?Not taking  VENLAFAXINE CAP 150MG ER, 150 mg= 1 cap(s), Oral, Daily  Xanax 1 mg oral tablet, 1 mg= 1 tab(s), Oral, BID  Allergies  penicillins?(Rash)  Social History  Alcohol - Medium Risk, 12/26/2015  Past, Beer, 1-2 times per week, 05/14/2016  Employment/School  Unemployed, 05/23/2016  Exercise  Home/Environment  Lives with lives in group home. Living situation: Home/Independent. Home equipment: Glucose monitoring. Alcohol abuse in household: No. Substance abuse in household: No. Smoker in household:  Yes. Injuries/Abuse/Neglect in household: No. Feels unsafe at home: No. Safe place to go: Yes. Family/Friends available for support: Yes. Concern for family members at home: No., 05/23/2016  Nutrition/Health  Regular, 05/23/2016  Sexual  Sexually active: No., 05/23/2016  Substance Abuse - High Risk, 05/01/2012  Past, Marijuana, Methamphetamines, Prescription medications, 1-2 times per year, 05/14/2016  Tobacco - High Risk, 05/01/2012  Current every day smoker, Cigarettes, 20 per day. Started age 18.0 Years., 06/05/2017  Family History  Cancer: Father.  Hyperlipidemia.: Mother and Father.  Hypertension.: Mother and Father.  Immunizations  Vaccine Date Status Comments   tetanus/diphtheria/pertussis, acel(Tdap) 01/31/2018 Given New Med Order   influenza virus vaccine, inactivated 11/10/2016 Given    influenza virus vaccine, inactivated 11/02/2016 Given    influenza virus vaccine, inactivated 10/05/2016 Given [employer]   influenza virus vaccine, inactivated 12/27/2015 Given

## 2022-11-22 ENCOUNTER — OFFICE VISIT (OUTPATIENT)
Dept: FAMILY MEDICINE | Facility: CLINIC | Age: 57
End: 2022-11-22
Payer: MEDICARE

## 2022-11-22 VITALS
HEART RATE: 90 BPM | TEMPERATURE: 97 F | SYSTOLIC BLOOD PRESSURE: 135 MMHG | HEIGHT: 73 IN | BODY MASS INDEX: 19.22 KG/M2 | WEIGHT: 145 LBS | RESPIRATION RATE: 20 BRPM | DIASTOLIC BLOOD PRESSURE: 73 MMHG

## 2022-11-22 DIAGNOSIS — H60.92 OTITIS EXTERNA OF LEFT EAR, UNSPECIFIED CHRONICITY, UNSPECIFIED TYPE: ICD-10-CM

## 2022-11-22 DIAGNOSIS — R06.2 WHEEZING: ICD-10-CM

## 2022-11-22 DIAGNOSIS — J44.9 CHRONIC OBSTRUCTIVE PULMONARY DISEASE, UNSPECIFIED COPD TYPE: ICD-10-CM

## 2022-11-22 PROCEDURE — 3008F PR BODY MASS INDEX (BMI) DOCUMENTED: ICD-10-PCS | Mod: ,,, | Performed by: NURSE PRACTITIONER

## 2022-11-22 PROCEDURE — 99203 PR OFFICE/OUTPT VISIT, NEW, LEVL III, 30-44 MIN: ICD-10-PCS | Mod: ,,, | Performed by: NURSE PRACTITIONER

## 2022-11-22 PROCEDURE — 3078F DIAST BP <80 MM HG: CPT | Mod: ,,, | Performed by: NURSE PRACTITIONER

## 2022-11-22 PROCEDURE — 3075F SYST BP GE 130 - 139MM HG: CPT | Mod: ,,, | Performed by: NURSE PRACTITIONER

## 2022-11-22 PROCEDURE — 3008F BODY MASS INDEX DOCD: CPT | Mod: ,,, | Performed by: NURSE PRACTITIONER

## 2022-11-22 PROCEDURE — 99203 OFFICE O/P NEW LOW 30 MIN: CPT | Mod: ,,, | Performed by: NURSE PRACTITIONER

## 2022-11-22 PROCEDURE — 3075F PR MOST RECENT SYSTOLIC BLOOD PRESS GE 130-139MM HG: ICD-10-PCS | Mod: ,,, | Performed by: NURSE PRACTITIONER

## 2022-11-22 PROCEDURE — 3078F PR MOST RECENT DIASTOLIC BLOOD PRESSURE < 80 MM HG: ICD-10-PCS | Mod: ,,, | Performed by: NURSE PRACTITIONER

## 2022-11-22 RX ORDER — GABAPENTIN 300 MG/1
CAPSULE ORAL
COMMUNITY
Start: 2022-10-20 | End: 2022-12-12 | Stop reason: SDUPTHER

## 2022-11-22 RX ORDER — INSULIN GLARGINE 100 [IU]/ML
40 INJECTION, SOLUTION SUBCUTANEOUS NIGHTLY
COMMUNITY
Start: 2022-10-26

## 2022-11-22 RX ORDER — ZOLPIDEM TARTRATE 10 MG/1
TABLET ORAL
COMMUNITY
Start: 2022-10-19

## 2022-11-22 RX ORDER — ONDANSETRON 4 MG/1
TABLET, ORALLY DISINTEGRATING ORAL
COMMUNITY
Start: 2022-10-27 | End: 2023-01-17

## 2022-11-22 RX ORDER — IPRATROPIUM BROMIDE AND ALBUTEROL SULFATE 2.5; .5 MG/3ML; MG/3ML
3 SOLUTION RESPIRATORY (INHALATION) EVERY 6 HOURS PRN
Qty: 75 ML | Refills: 0 | Status: SHIPPED | OUTPATIENT
Start: 2022-11-22 | End: 2023-11-22

## 2022-11-22 RX ORDER — DULOXETIN HYDROCHLORIDE 60 MG/1
60 CAPSULE, DELAYED RELEASE ORAL
COMMUNITY
Start: 2021-12-06

## 2022-11-22 RX ORDER — QUETIAPINE FUMARATE 200 MG/1
TABLET, FILM COATED ORAL
COMMUNITY
Start: 2022-11-14

## 2022-11-22 RX ORDER — PANTOPRAZOLE SODIUM 40 MG/1
TABLET, DELAYED RELEASE ORAL
COMMUNITY
Start: 2022-08-26

## 2022-11-22 RX ORDER — NEOMYCIN SULFATE, POLYMYXIN B SULFATE AND HYDROCORTISONE 10; 3.5; 1 MG/ML; MG/ML; [USP'U]/ML
3 SUSPENSION/ DROPS AURICULAR (OTIC) 3 TIMES DAILY
Qty: 6 ML | Refills: 0 | Status: SHIPPED | OUTPATIENT
Start: 2022-11-22 | End: 2022-12-02

## 2022-11-22 RX ORDER — INSULIN ASPART 100 [IU]/ML
INJECTION, SOLUTION INTRAVENOUS; SUBCUTANEOUS
COMMUNITY
Start: 2022-09-16

## 2022-11-22 RX ORDER — LINACLOTIDE 290 UG/1
CAPSULE, GELATIN COATED ORAL
COMMUNITY
Start: 2022-10-13

## 2022-11-22 RX ORDER — ALPRAZOLAM 2 MG/1
TABLET ORAL
COMMUNITY
Start: 2022-11-03

## 2022-11-22 RX ORDER — ERGOCALCIFEROL 1.25 MG/1
50000 CAPSULE ORAL
COMMUNITY
Start: 2022-09-16

## 2022-11-22 RX ORDER — DESVENLAFAXINE SUCCINATE 50 MG/1
TABLET, EXTENDED RELEASE ORAL
COMMUNITY
Start: 2022-07-18

## 2022-11-22 NOTE — PROGRESS NOTES
Subjective:       Patient ID: Hakeem Mcdowell is a 57 y.o. male.    Chief Complaint: Otalgia (Left earache X's 3 days)      The patient is a frail old man with a history of HIV, hepatitis-C, COPD, tobacco abuse, diabetes, and neuropathy.  Patient presents today because he has left ear pain.    Review of Systems   HENT:  Positive for ear pain.    Respiratory:  Positive for shortness of breath and wheezing.    All other systems reviewed and are negative.      Objective:      Physical Exam  Vitals and nursing note reviewed.   Constitutional:       Appearance: He is ill-appearing.   HENT:      Head: Normocephalic.      Ears:      Comments: Right ear canal clear left ear canal erythemic and swollen.     Nose: Nose normal.      Mouth/Throat:      Mouth: Mucous membranes are moist.   Eyes:      Extraocular Movements: Extraocular movements intact.   Cardiovascular:      Rate and Rhythm: Normal rate and regular rhythm.      Pulses: Normal pulses.      Heart sounds: Normal heart sounds.   Pulmonary:      Breath sounds: Wheezing present.      Comments: Inspiratory wheezes especially in the right lower lobe  Abdominal:      Palpations: Abdomen is soft.   Musculoskeletal:         General: Normal range of motion.      Cervical back: Normal range of motion and neck supple.   Skin:     General: Skin is warm and dry.   Neurological:      Mental Status: He is alert and oriented to person, place, and time.       Assessment:       Problem List Items Addressed This Visit          ENT    Otitis externa of left ear       Pulmonary    Chronic obstructive pulmonary disease    Relevant Medications    albuterol-ipratropium (DUO-NEB) 2.5 mg-0.5 mg/3 mL nebulizer solution    Wheezing    Relevant Medications    albuterol-ipratropium (DUO-NEB) 2.5 mg-0.5 mg/3 mL nebulizer solution       Plan:     Otitis externa of the left ear:  Use prescription eardrops as ordered    COPD and wheezing  Go home and use nebulizer q.4 hours as needed shortness of  breath  Call primary care physician if no improvement    Call the office with any questions or concerns

## 2022-12-11 ENCOUNTER — HOSPITAL ENCOUNTER (EMERGENCY)
Facility: HOSPITAL | Age: 57
Discharge: HOME | End: 2022-12-11
Attending: EMERGENCY MEDICINE
Payer: MEDICARE

## 2022-12-11 VITALS
DIASTOLIC BLOOD PRESSURE: 96 MMHG | WEIGHT: 168 LBS | HEART RATE: 96 BPM | HEIGHT: 73 IN | RESPIRATION RATE: 18 BRPM | SYSTOLIC BLOOD PRESSURE: 188 MMHG | TEMPERATURE: 99 F | BODY MASS INDEX: 22.26 KG/M2 | OXYGEN SATURATION: 98 %

## 2022-12-11 DIAGNOSIS — M79.89 HAND SWELLING: ICD-10-CM

## 2022-12-11 DIAGNOSIS — M79.645 FINGER PAIN, LEFT: Primary | ICD-10-CM

## 2022-12-11 PROCEDURE — 63600175 PHARM REV CODE 636 W HCPCS: Performed by: NURSE PRACTITIONER

## 2022-12-11 PROCEDURE — 99284 EMERGENCY DEPT VISIT MOD MDM: CPT

## 2022-12-11 PROCEDURE — 96372 THER/PROPH/DIAG INJ SC/IM: CPT | Performed by: NURSE PRACTITIONER

## 2022-12-11 RX ORDER — KETOROLAC TROMETHAMINE 30 MG/ML
60 INJECTION, SOLUTION INTRAMUSCULAR; INTRAVENOUS
Status: COMPLETED | OUTPATIENT
Start: 2022-12-11 | End: 2022-12-11

## 2022-12-11 RX ADMIN — KETOROLAC TROMETHAMINE 60 MG: 30 INJECTION, SOLUTION INTRAMUSCULAR at 03:12

## 2022-12-11 NOTE — ED PROVIDER NOTES
Encounter Date: 12/11/2022       History     Chief Complaint   Patient presents with    Hand Pain     Pain and swelling to left hand middle finger since Thursday.     Patient complains of left 3rd finger swelling and pain.  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  Patient has an appointment to see his primary care provider tomorrow.    The history is provided by the patient.   Hand Pain  This is a chronic problem. The current episode started more than 2 days ago. The problem occurs constantly. The problem has not changed since onset.Pertinent negatives include no chest pain, no abdominal pain, no headaches and no shortness of breath. The symptoms are aggravated by bending. Nothing relieves the symptoms. He has tried nothing for the symptoms.   Review of patient's allergies indicates:   Allergen Reactions    Penicillins Swelling and Rash     No past medical history on file.  No past surgical history on file.  No family history on file.  Social History     Tobacco Use    Smoking status: Every Day     Packs/day: 0.50     Types: Cigarettes    Smokeless tobacco: Never     Review of Systems   Constitutional:  Negative for fever.   HENT:  Negative for sore throat.    Respiratory:  Negative for shortness of breath.    Cardiovascular:  Negative for chest pain.   Gastrointestinal:  Negative for abdominal pain and nausea.   Genitourinary:  Negative for dysuria.   Musculoskeletal:  Negative for back pain.   Skin:  Negative for rash.   Neurological:  Negative for weakness and headaches.   Hematological:  Does not bruise/bleed easily.     Physical Exam     Initial Vitals [12/11/22 1413]   BP Pulse Resp Temp SpO2   (!) 188/96 96 18 98.5 °F (36.9 °C) 98 %      MAP       --         Physical Exam    Nursing note and vitals reviewed.  Constitutional: He appears well-developed and well-nourished.   HENT:   Head: Normocephalic and atraumatic.    Eyes: Conjunctivae are normal. Pupils are equal, round, and reactive to light.   Neck: Neck supple.   Normal range of motion.  Cardiovascular:  Normal rate, regular rhythm, normal heart sounds and intact distal pulses.           Pulmonary/Chest: Breath sounds normal.   Abdominal: Abdomen is soft. There is no rebound and no guarding.   Musculoskeletal:         General: Normal range of motion.      Cervical back: Normal range of motion and neck supple.      Comments: Numerous bilateral hand finger contractures appear chronic in nature.  No erythema     Neurological: He is alert.   Skin: Skin is warm and dry.   Psychiatric: He has a normal mood and affect. His behavior is normal. Thought content normal.       ED Course   Procedures  Labs Reviewed - No data to display       Imaging Results              X-Ray Hand 3 view Left (Final result)  Result time 12/11/22 14:45:01   Procedure changed from X-Ray Hand 2 View Left     Final result by Raúl Nicholson MD (12/11/22 14:45:01)                   Impression:      Soft tissue swelling of the middle finger with unchanged finger contraction.      Electronically signed by: Raúl Nicholson MD  Date:    12/11/2022  Time:    14:45               Narrative:    EXAMINATION:  Three radiographic views of the LEFT HAND.    CLINICAL HISTORY:  Other specified soft tissue disordershand pain ;    TECHNIQUE:  Three radiographic views of the LEFT HAND.    COMPARISON:  Left hand radiograph 04/10/2022.    FINDINGS:  Three views of the left hand demonstrate unchanged contracted position of all fingers.  There is no fracture.  There is no bony mass lesion.  There is soft tissue swelling of the middle finger.                                       Medications   ketorolac injection 60 mg (has no administration in time range)                              Clinical Impression:   Final diagnoses:  [M79.89] Hand swelling  [M79.89] Hand swelling - 3rd finger pain               Juan Renee NP  12/11/22  [id number]

## 2022-12-12 ENCOUNTER — OFFICE VISIT (OUTPATIENT)
Dept: FAMILY MEDICINE | Facility: CLINIC | Age: 57
End: 2022-12-12
Payer: MEDICARE

## 2022-12-12 VITALS
BODY MASS INDEX: 22.26 KG/M2 | DIASTOLIC BLOOD PRESSURE: 75 MMHG | HEIGHT: 73 IN | TEMPERATURE: 97 F | WEIGHT: 168 LBS | HEART RATE: 100 BPM | SYSTOLIC BLOOD PRESSURE: 131 MMHG | RESPIRATION RATE: 20 BRPM

## 2022-12-12 DIAGNOSIS — J44.9 CHRONIC OBSTRUCTIVE PULMONARY DISEASE, UNSPECIFIED COPD TYPE: ICD-10-CM

## 2022-12-12 DIAGNOSIS — M79.642 PAIN OF LEFT HAND: ICD-10-CM

## 2022-12-12 DIAGNOSIS — E11.69 TYPE 2 DIABETES MELLITUS WITH OTHER SPECIFIED COMPLICATION, WITH LONG-TERM CURRENT USE OF INSULIN: ICD-10-CM

## 2022-12-12 DIAGNOSIS — Z79.4 TYPE 2 DIABETES MELLITUS WITH OTHER SPECIFIED COMPLICATION, WITH LONG-TERM CURRENT USE OF INSULIN: ICD-10-CM

## 2022-12-12 DIAGNOSIS — R26.89 NEED FOR ASSISTANCE DUE TO UNSTEADY GAIT: ICD-10-CM

## 2022-12-12 DIAGNOSIS — Z72.0 NICOTINE USE: ICD-10-CM

## 2022-12-12 DIAGNOSIS — M62.838 MUSCLE SPASTICITY: ICD-10-CM

## 2022-12-12 DIAGNOSIS — M24.549 CONTRACTURE OF JOINT OF HAND, UNSPECIFIED LATERALITY: ICD-10-CM

## 2022-12-12 PROBLEM — E11.9 TYPE 2 DIABETES MELLITUS, WITH LONG-TERM CURRENT USE OF INSULIN: Status: ACTIVE | Noted: 2022-12-12

## 2022-12-12 LAB — GLUCOSE SERPL-MCNC: 298 MG/DL (ref 70–110)

## 2022-12-12 PROCEDURE — 1159F MED LIST DOCD IN RCRD: CPT | Mod: ,,, | Performed by: NURSE PRACTITIONER

## 2022-12-12 PROCEDURE — 3075F SYST BP GE 130 - 139MM HG: CPT | Mod: ,,, | Performed by: NURSE PRACTITIONER

## 2022-12-12 PROCEDURE — 3078F PR MOST RECENT DIASTOLIC BLOOD PRESSURE < 80 MM HG: ICD-10-PCS | Mod: ,,, | Performed by: NURSE PRACTITIONER

## 2022-12-12 PROCEDURE — 3078F DIAST BP <80 MM HG: CPT | Mod: ,,, | Performed by: NURSE PRACTITIONER

## 2022-12-12 PROCEDURE — 82962 GLUCOSE BLOOD TEST: CPT | Mod: RHCUB | Performed by: NURSE PRACTITIONER

## 2022-12-12 PROCEDURE — 99213 OFFICE O/P EST LOW 20 MIN: CPT | Mod: ,,, | Performed by: NURSE PRACTITIONER

## 2022-12-12 PROCEDURE — 3075F PR MOST RECENT SYSTOLIC BLOOD PRESS GE 130-139MM HG: ICD-10-PCS | Mod: ,,, | Performed by: NURSE PRACTITIONER

## 2022-12-12 PROCEDURE — 99213 PR OFFICE/OUTPT VISIT, EST, LEVL III, 20-29 MIN: ICD-10-PCS | Mod: ,,, | Performed by: NURSE PRACTITIONER

## 2022-12-12 PROCEDURE — 1159F PR MEDICATION LIST DOCUMENTED IN MEDICAL RECORD: ICD-10-PCS | Mod: ,,, | Performed by: NURSE PRACTITIONER

## 2022-12-12 PROCEDURE — 3008F PR BODY MASS INDEX (BMI) DOCUMENTED: ICD-10-PCS | Mod: ,,, | Performed by: NURSE PRACTITIONER

## 2022-12-12 PROCEDURE — 3008F BODY MASS INDEX DOCD: CPT | Mod: ,,, | Performed by: NURSE PRACTITIONER

## 2022-12-12 RX ORDER — GABAPENTIN 300 MG/1
600 CAPSULE ORAL 3 TIMES DAILY
Qty: 180 CAPSULE | Refills: 3 | Status: SHIPPED | OUTPATIENT
Start: 2022-12-12 | End: 2023-01-11

## 2022-12-12 RX ORDER — MELOXICAM 15 MG/1
15 TABLET ORAL DAILY
Qty: 14 TABLET | Refills: 1 | Status: SHIPPED | OUTPATIENT
Start: 2022-12-12 | End: 2023-01-17

## 2022-12-12 NOTE — PROGRESS NOTES
Boy  Subjective:       Patient ID: Hakeem Mcdowell is a 57 y.o. male.    Chief Complaint: Hand Pain (Follow up ER visit for finger pain;  fill out paperwork for wheel chair.) and Diabetes (Pt would like order for diabetic shoes)      The patient has muscle spasticity.  His gait is unsteady with a cane.  The patient has frequent falls and requires a wheelchair.  I will fill out the paperwork for the wheelchair.    The patient is also diabetic and requesting diabetic shoes.  I told him this is a whole examined itself and the patient will come back later this week for the same.  We did check his blood sugar via fingerstick and it was 289.          Review of Systems   Constitutional: Negative.    HENT: Negative.     Eyes: Negative.    Respiratory: Negative.     Endocrine: Negative.    Genitourinary: Negative.    Musculoskeletal:  Positive for gait problem, joint swelling and joint deformity.        Hand pain especially the left hand   Neurological:  Positive for coordination difficulties and coordination difficulties.   Hematological: Negative.        Objective:      Physical Exam  Vitals and nursing note reviewed.   Constitutional:       Appearance: He is ill-appearing.   HENT:      Head: Normocephalic.      Right Ear: Tympanic membrane normal.      Left Ear: Tympanic membrane normal.      Nose: Nose normal.      Mouth/Throat:      Mouth: Mucous membranes are moist.   Eyes:      Extraocular Movements: Extraocular movements intact.   Cardiovascular:      Rate and Rhythm: Normal rate and regular rhythm.      Heart sounds: No murmur heard.  Pulmonary:      Effort: Pulmonary effort is normal.      Breath sounds: Normal breath sounds.   Abdominal:      General: Bowel sounds are normal.      Palpations: Abdomen is soft.   Musculoskeletal:         General: Normal range of motion.      Cervical back: Normal range of motion and neck supple.   Skin:     General: Skin is warm and dry.   Neurological:      Mental Status: He is  alert and oriented to person, place, and time.       Assessment:       Problem List Items Addressed This Visit          Pulmonary    Chronic obstructive pulmonary disease       Endocrine    Type 2 diabetes mellitus, with long-term current use of insulin    Relevant Orders    POCT Glucose, Hand-Held Device (Completed)       Orthopedic    Muscle spasticity    Contracture of hand joint    Pain of left hand    Relevant Medications    meloxicam (MOBIC) 15 MG tablet       Other    Need for assistance due to unsteady gait    Nicotine use       Plan:     COPD:  Working on getting nebulizer machine.  Patient using inhalers   continue current medication    Type 2 diabetes:  We will see the patient later this week to re-evaluate  Diabetic foot exam later this week  Continue current medication until re-evaluation    Muscle spasms/contracture of hand joints/pain and left hand:  This is a long-term problem  Patient is requesting a Toradol shot  Patient had Toradol yesterday and I informed him this could be consulting to the kidneys therefore we will wait to give him a shot until Thursday or Friday.    Need for assistance to the unsteady gait:  Wheelchair ordered    Tobacco user  Adverse affects of tobacco discussed; 5 minutes spent counseling  Patient encouraged to remain abstinent from tobacco products  Return to clinic with any concerns    This patient has multiple comorbid problems the need for end of life care.  I will refer him to hospice.  [facility]    Call the office with any questions or concerns

## 2022-12-14 NOTE — PROGRESS NOTES
The patient is debilitated male who is wheelchair dependent and has COPD and wheezing.  He also has frequent cough.  He requires albuterol 0.83% solution dispensed 2 boxes (120 vitals) inhale 1 vial of albuterol 0.83% 4 times a day with nebulizer.

## 2022-12-15 ENCOUNTER — OFFICE VISIT (OUTPATIENT)
Dept: FAMILY MEDICINE | Facility: CLINIC | Age: 57
End: 2022-12-15
Payer: MEDICARE

## 2022-12-15 VITALS
HEIGHT: 73 IN | BODY MASS INDEX: 22.26 KG/M2 | WEIGHT: 168 LBS | RESPIRATION RATE: 20 BRPM | HEART RATE: 104 BPM | SYSTOLIC BLOOD PRESSURE: 102 MMHG | TEMPERATURE: 97 F | DIASTOLIC BLOOD PRESSURE: 65 MMHG

## 2022-12-15 DIAGNOSIS — R52 PAIN: ICD-10-CM

## 2022-12-15 DIAGNOSIS — M79.604 BILATERAL LEG PAIN: ICD-10-CM

## 2022-12-15 DIAGNOSIS — E11.69 TYPE 2 DIABETES MELLITUS WITH OTHER SPECIFIED COMPLICATION, UNSPECIFIED WHETHER LONG TERM INSULIN USE: ICD-10-CM

## 2022-12-15 DIAGNOSIS — J44.9 COPD MIXED TYPE: ICD-10-CM

## 2022-12-15 DIAGNOSIS — M79.605 BILATERAL LEG PAIN: ICD-10-CM

## 2022-12-15 PROCEDURE — 96372 PR INJECTION,THERAP/PROPH/DIAG2ST, IM OR SUBCUT: ICD-10-PCS | Mod: ,,, | Performed by: NURSE PRACTITIONER

## 2022-12-15 PROCEDURE — 3074F PR MOST RECENT SYSTOLIC BLOOD PRESSURE < 130 MM HG: ICD-10-PCS | Mod: ,,, | Performed by: NURSE PRACTITIONER

## 2022-12-15 PROCEDURE — 3008F PR BODY MASS INDEX (BMI) DOCUMENTED: ICD-10-PCS | Mod: ,,, | Performed by: NURSE PRACTITIONER

## 2022-12-15 PROCEDURE — 3078F DIAST BP <80 MM HG: CPT | Mod: ,,, | Performed by: NURSE PRACTITIONER

## 2022-12-15 PROCEDURE — 1159F MED LIST DOCD IN RCRD: CPT | Mod: ,,, | Performed by: NURSE PRACTITIONER

## 2022-12-15 PROCEDURE — 1159F PR MEDICATION LIST DOCUMENTED IN MEDICAL RECORD: ICD-10-PCS | Mod: ,,, | Performed by: NURSE PRACTITIONER

## 2022-12-15 PROCEDURE — 3078F PR MOST RECENT DIASTOLIC BLOOD PRESSURE < 80 MM HG: ICD-10-PCS | Mod: ,,, | Performed by: NURSE PRACTITIONER

## 2022-12-15 PROCEDURE — 96372 THER/PROPH/DIAG INJ SC/IM: CPT | Mod: ,,, | Performed by: NURSE PRACTITIONER

## 2022-12-15 PROCEDURE — 3074F SYST BP LT 130 MM HG: CPT | Mod: ,,, | Performed by: NURSE PRACTITIONER

## 2022-12-15 PROCEDURE — 99214 OFFICE O/P EST MOD 30 MIN: CPT | Mod: 25,,, | Performed by: NURSE PRACTITIONER

## 2022-12-15 PROCEDURE — 3008F BODY MASS INDEX DOCD: CPT | Mod: ,,, | Performed by: NURSE PRACTITIONER

## 2022-12-15 PROCEDURE — 99214 PR OFFICE/OUTPT VISIT, EST, LEVL IV, 30-39 MIN: ICD-10-PCS | Mod: 25,,, | Performed by: NURSE PRACTITIONER

## 2022-12-15 RX ORDER — KETOROLAC TROMETHAMINE 30 MG/ML
30 INJECTION, SOLUTION INTRAMUSCULAR; INTRAVENOUS
Status: COMPLETED | OUTPATIENT
Start: 2022-12-15 | End: 2022-12-15

## 2022-12-15 RX ADMIN — KETOROLAC TROMETHAMINE 30 MG: 30 INJECTION, SOLUTION INTRAMUSCULAR; INTRAVENOUS at 01:12

## 2022-12-15 NOTE — PROGRESS NOTES
Subjective:       Patient ID: Hakeem Mcdowell is a 57 y.o. male.    Chief Complaint: Diabetic Foot Exam      The patient is here for diabetic foot exam.  The patient is thin and frail all the toes on his left foot been amputated.  He tells me his last A1c was 16.  I can not find any A1c on his chart.  In April of this year his fasting glucose was 320.  We did a fingerstick random glucose with the patient in our office and it was 298.  I will order an A1c.      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  The patient has calluses on both heels with a in denture in the callus on the right heel indicative of pre ulcer formation.  The patient has poor circulation and I can not hear the patient's arteries with Doppler.    The patient has a history of COPD,  generalized pain, finger pain, and leg pain bilaterally.  Patient states that time the pain is 8/10.  Patient is asking for lorazepam and I told him with his substance abuse history I could not accommodate.  Patient's pain level is anywhere from dull and achy to throbbing.  Patient states the pain is constant.        Review of Systems   Constitutional:  Positive for fatigue.   HENT: Negative.     Eyes: Negative.    Respiratory: Negative.     Cardiovascular: Negative.    Gastrointestinal: Negative.    Endocrine: Negative.    Genitourinary: Negative.    Musculoskeletal: Negative.    Integumentary:  Negative.   Neurological: Negative.    Hematological: Negative.        Objective:      Physical Exam  Vitals and nursing note reviewed.   HENT:      Head: Normocephalic.      Nose: Nose normal.      Mouth/Throat:      Mouth: Mucous membranes are moist.   Eyes:      Extraocular Movements: Extraocular movements intact.   Cardiovascular:      Rate and Rhythm: Normal rate and regular rhythm.      Heart sounds: Murmur heard.   Pulmonary:      Effort: Pulmonary effort is normal.      Breath sounds: Normal breath sounds.   Abdominal:       General: Bowel sounds are normal.      Palpations: Abdomen is soft.   Musculoskeletal:      Cervical back: Normal range of motion and neck supple.      Comments: Numbness to legs and feet.  Patient ambulate legs with a wide unsteady gait and uses a cane but is in a wheelchair much of the time.   Skin:     General: Skin is warm.   Neurological:      Mental Status: He is alert and oriented to person, place, and time.       Assessment:       Problem List Items Addressed This Visit          Pulmonary    COPD mixed type    Relevant Orders    CBC Auto Differential    Comprehensive Metabolic Panel    Lipid Panel    Urinalysis       Endocrine    Type 2 diabetes mellitus with other specified complication    Relevant Orders    CBC Auto Differential    Comprehensive Metabolic Panel    Lipid Panel    Urinalysis    Hemoglobin A1C    Foot Exam Performed (Completed)       Orthopedic    Bilateral leg pain    Relevant Orders    US Lower Extremity Arteries Bilateral       Other    Pain    Relevant Medications    ketorolac injection 30 mg (Completed)       Plan:     COPD mixed type:  We are working on nebulizer for the patient.    Patient states that his O2 concentrator needs maintenance.  Patient instructed to call [employer] medical equipment.      Type 2 diabetes with other specified complication:  Diabetic foot exam performed  A1c ordered    Bilateral leg pain:  Ultrasound arterial ordered    Call the office with any questions or concerns

## 2023-01-04 ENCOUNTER — HOSPITAL ENCOUNTER (EMERGENCY)
Facility: HOSPITAL | Age: 58
Discharge: HOME | End: 2023-01-04
Attending: INTERNAL MEDICINE
Payer: MEDICARE

## 2023-01-04 VITALS
TEMPERATURE: 99 F | DIASTOLIC BLOOD PRESSURE: 87 MMHG | BODY MASS INDEX: 17.89 KG/M2 | HEART RATE: 98 BPM | SYSTOLIC BLOOD PRESSURE: 161 MMHG | RESPIRATION RATE: 20 BRPM | HEIGHT: 73 IN | OXYGEN SATURATION: 96 % | WEIGHT: 135 LBS

## 2023-01-04 DIAGNOSIS — L08.9 INFECTION OF FINGER: Primary | ICD-10-CM

## 2023-01-04 DIAGNOSIS — R73.9 HYPERGLYCEMIA: ICD-10-CM

## 2023-01-04 DIAGNOSIS — R42 DIZZINESS: ICD-10-CM

## 2023-01-04 LAB
ALBUMIN SERPL-MCNC: 2.1 G/DL (ref 3.5–5)
ALBUMIN/GLOB SERPL: 0.4 RATIO (ref 1.1–2)
ALP SERPL-CCNC: 109 UNIT/L (ref 40–150)
ALT SERPL-CCNC: 6 UNIT/L (ref 0–55)
AST SERPL-CCNC: 9 UNIT/L (ref 5–34)
BASOPHILS # BLD AUTO: 0.05 X10(3)/MCL (ref 0–0.2)
BASOPHILS NFR BLD AUTO: 0.3 %
BILIRUBIN DIRECT+TOT PNL SERPL-MCNC: 0.2 MG/DL
BUN SERPL-MCNC: 8 MG/DL (ref 8.4–25.7)
CALCIUM SERPL-MCNC: 8.7 MG/DL (ref 8.4–10.2)
CHLORIDE SERPL-SCNC: 91 MMOL/L (ref 98–107)
CO2 SERPL-SCNC: 33 MMOL/L (ref 22–29)
CREAT SERPL-MCNC: 0.76 MG/DL (ref 0.73–1.18)
EOSINOPHIL # BLD AUTO: 0.06 X10(3)/MCL (ref 0–0.9)
EOSINOPHIL NFR BLD AUTO: 0.4 %
ERYTHROCYTE [DISTWIDTH] IN BLOOD BY AUTOMATED COUNT: 13.6 % (ref 11.6–14.4)
ERYTHROCYTE [SEDIMENTATION RATE] IN BLOOD: >140 MM/HR (ref 0–15)
GFR SERPLBLD CREATININE-BSD FMLA CKD-EPI: >90 MLS/MIN/1.73/M2
GLOBULIN SER-MCNC: 6 GM/DL (ref 2.4–3.5)
GLUCOSE SERPL-MCNC: 378 MG/DL (ref 74–100)
HCT VFR BLD AUTO: 29.1 % (ref 42–52)
HGB BLD-MCNC: 9.4 GM/DL (ref 14–18)
IMM GRANULOCYTES # BLD AUTO: 0.08 X10(3)/MCL (ref 0–0.04)
IMM GRANULOCYTES NFR BLD AUTO: 0.5 %
LYMPHOCYTES # BLD AUTO: 2.59 X10(3)/MCL (ref 0.6–4.6)
LYMPHOCYTES NFR BLD AUTO: 16.2 %
MCH RBC QN AUTO: 25.9 PG
MCHC RBC AUTO-ENTMCNC: 32.3 MG/DL (ref 33–36)
MCV RBC AUTO: 80.2 FL (ref 80–94)
MONOCYTES # BLD AUTO: 0.85 X10(3)/MCL (ref 0.1–1.3)
MONOCYTES NFR BLD AUTO: 5.3 %
NEUTROPHILS # BLD AUTO: 12.39 X10(3)/MCL (ref 2.1–9.2)
NEUTROPHILS NFR BLD AUTO: 77.3 %
PLATELET # BLD AUTO: 652 X10(3)/MCL (ref 140–371)
PMV BLD AUTO: 7.8 FL (ref 9.4–12.4)
POTASSIUM SERPL-SCNC: 4.1 MMOL/L (ref 3.5–5.1)
PROT SERPL-MCNC: 8.1 GM/DL (ref 6.4–8.3)
RBC # BLD AUTO: 3.63 X10(6)/MCL (ref 4.7–6.1)
SODIUM SERPL-SCNC: 135 MMOL/L (ref 136–145)
TROPONIN I SERPL-MCNC: 0.04 NG/ML (ref 0–0.04)
WBC # SPEC AUTO: 16 X10(3)/MCL (ref 4.5–11.5)

## 2023-01-04 PROCEDURE — 93010 ELECTROCARDIOGRAM REPORT: CPT | Mod: ,,, | Performed by: INTERNAL MEDICINE

## 2023-01-04 PROCEDURE — 84484 ASSAY OF TROPONIN QUANT: CPT | Performed by: PHYSICIAN ASSISTANT

## 2023-01-04 PROCEDURE — 86141 C-REACTIVE PROTEIN HS: CPT | Performed by: PHYSICIAN ASSISTANT

## 2023-01-04 PROCEDURE — 96374 THER/PROPH/DIAG INJ IV PUSH: CPT

## 2023-01-04 PROCEDURE — 85025 COMPLETE CBC W/AUTO DIFF WBC: CPT | Performed by: PHYSICIAN ASSISTANT

## 2023-01-04 PROCEDURE — 63600175 PHARM REV CODE 636 W HCPCS: Performed by: PHYSICIAN ASSISTANT

## 2023-01-04 PROCEDURE — 25000003 PHARM REV CODE 250: Performed by: PHYSICIAN ASSISTANT

## 2023-01-04 PROCEDURE — 96372 THER/PROPH/DIAG INJ SC/IM: CPT | Mod: 59 | Performed by: PHYSICIAN ASSISTANT

## 2023-01-04 PROCEDURE — 93005 ELECTROCARDIOGRAM TRACING: CPT

## 2023-01-04 PROCEDURE — 80053 COMPREHEN METABOLIC PANEL: CPT | Performed by: PHYSICIAN ASSISTANT

## 2023-01-04 PROCEDURE — 85651 RBC SED RATE NONAUTOMATED: CPT | Performed by: PHYSICIAN ASSISTANT

## 2023-01-04 PROCEDURE — 93010 EKG 12-LEAD: ICD-10-PCS | Mod: ,,, | Performed by: INTERNAL MEDICINE

## 2023-01-04 PROCEDURE — 96361 HYDRATE IV INFUSION ADD-ON: CPT

## 2023-01-04 PROCEDURE — 82962 GLUCOSE BLOOD TEST: CPT

## 2023-01-04 PROCEDURE — 99285 EMERGENCY DEPT VISIT HI MDM: CPT | Mod: 25

## 2023-01-04 RX ORDER — CLINDAMYCIN PHOSPHATE 150 MG/ML
600 INJECTION, SOLUTION INTRAVENOUS
Status: COMPLETED | OUTPATIENT
Start: 2023-01-04 | End: 2023-01-04

## 2023-01-04 RX ORDER — SULFAMETHOXAZOLE AND TRIMETHOPRIM 800; 160 MG/1; MG/1
1 TABLET ORAL 2 TIMES DAILY
Qty: 14 TABLET | Refills: 0 | Status: SHIPPED | OUTPATIENT
Start: 2023-01-04 | End: 2023-01-11

## 2023-01-04 RX ORDER — KETOROLAC TROMETHAMINE 30 MG/ML
30 INJECTION, SOLUTION INTRAMUSCULAR; INTRAVENOUS
Status: COMPLETED | OUTPATIENT
Start: 2023-01-04 | End: 2023-01-04

## 2023-01-04 RX ADMIN — CLINDAMYCIN PHOSPHATE 600 MG: 150 INJECTION, SOLUTION INTRAVENOUS at 07:01

## 2023-01-04 RX ADMIN — KETOROLAC TROMETHAMINE 30 MG: 30 INJECTION, SOLUTION INTRAMUSCULAR; INTRAVENOUS at 07:01

## 2023-01-04 RX ADMIN — SODIUM CHLORIDE 1000 ML: 9 INJECTION, SOLUTION INTRAVENOUS at 05:01

## 2023-01-04 NOTE — FIRST PROVIDER EVALUATION
"Medical screening examination initiated.  I have conducted a focused provider triage encounter, findings are as follows:    Brief history of present illness:  56 yo male with hx of DM presents to ED for evaluation of left 3rd finger swelling and pain.  Complains of finger cracking and draining. 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 States last time it got this bad they had to amputate. Reports to having dizziness with multiple falls.     Vitals:    01/04/23 1601   BP: (!) 166/81   Pulse: 108   Resp: 20   Temp: 99.4 °F (37.4 °C)   TempSrc: Oral   SpO2: (!) 94%   Weight: 61.2 kg (135 lb)   Height: 6' 1" (1.854 m)       Pertinent physical exam:  Patient awake alert and oriented.  Contractures noted to left hand.  Third finger with cracking.  Purulent drainage noted from distal tip.      Brief workup plan:  Labs, EKG, IVF, POCT glucose    Preliminary workup initiated; this workup will be continued and followed by the physician or advanced practice provider that is assigned to the patient when roomed.  " Nephrology

## 2023-01-05 LAB
CRP SERPL HS-MCNC: 103.08 MG/L
POCT GLUCOSE: 404 MG/DL (ref 70–110)

## 2023-01-05 NOTE — ED PROVIDER NOTES
Encounter Date: 1/4/2023       History     Chief Complaint   Patient presents with    Hand Pain     L hand middle finger swelling and infection    hx of same with mult amputations   this one started 3 months ago     56 yo male with hx of DM presents to ED for evaluation of left 3rd finger swelling and pain.  Complains of finger cracking and draining. 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 States last time it got this bad they had to amputate. Reports to having dizziness with multiple falls. Patient states having uncontrolled blood sugar at home.     The history is provided by the patient. No  was used.   Review of patient's allergies indicates:   Allergen Reactions    Penicillins Swelling and Rash     No past medical history on file.  No past surgical history on file.  No family history on file.  Social History     Tobacco Use    Smoking status: Every Day     Packs/day: 0.50     Types: Cigarettes    Smokeless tobacco: Never     Review of Systems   Constitutional:  Negative for fever.   HENT:  Negative for sore throat.    Respiratory:  Negative for shortness of breath.    Cardiovascular:  Negative for chest pain.   Gastrointestinal:  Negative for nausea.   Genitourinary:  Negative for dysuria.   Musculoskeletal:  Positive for arthralgias and joint swelling. Negative for back pain.   Skin:  Positive for wound. Negative for rash.   Neurological:  Negative for weakness.   Hematological:  Does not bruise/bleed easily.     Physical Exam     Initial Vitals [01/04/23 1601]   BP Pulse Resp Temp SpO2   (!) 166/81 108 20 99.4 °F (37.4 °C) (!) 94 %      MAP       --         Physical Exam    Nursing note and vitals reviewed.  Constitutional: He appears well-developed. He is cooperative.   HENT:   Head: Normocephalic and atraumatic.   Right Ear: External ear normal.   Left Ear: External ear normal.   Eyes:  Conjunctivae are normal. Pupils are equal, round, and reactive to light.   Neck: Neck supple.   Normal range of motion.  Cardiovascular:  Normal rate, regular rhythm and normal heart sounds.           Pulmonary/Chest: Breath sounds normal. No respiratory distress. He has no wheezes. He has no rhonchi. He has no rales.   Abdominal: Abdomen is soft. Bowel sounds are normal. There is no abdominal tenderness.   Musculoskeletal:         General: Normal range of motion.      Cervical back: Normal range of motion and neck supple.      Comments: Left middle finger distal phalanx with mild purulent drainage and erythema.  Cracking noted.  Chronic contractures of hands.  Radial pulses 2+.  All other joints adjacent negative.     Neurological: He is alert and oriented to person, place, and time.   Skin: Skin is warm and dry. Capillary refill takes less than 2 seconds.   Psychiatric: He has a normal mood and affect.       ED Course   Procedures  Labs Reviewed   COMPREHENSIVE METABOLIC PANEL - Abnormal; Notable for the following components:       Result Value    Sodium Level 135 (*)     Chloride 91 (*)     Carbon Dioxide 33 (*)     Glucose Level 378 (*)     Blood Urea Nitrogen 8.0 (*)     Albumin Level 2.1 (*)     Globulin 6.0 (*)     Albumin/Globulin Ratio 0.4 (*)     All other components within normal limits   SEDIMENTATION RATE, AUTOMATED - Abnormal; Notable for the following components:    Sed Rate >140 (*)     All other components within normal limits   CBC WITH DIFFERENTIAL - Abnormal; Notable for the following components:    WBC 16.0 (*)     RBC 3.63 (*)     Hgb 9.4 (*)     Hct 29.1 (*)     MCHC 32.3 (*)     Platelet 652 (*)     MPV 7.8 (*)     Neut # 12.39 (*)     IG# 0.08 (*)     All other components within normal limits   TROPONIN I - Normal   CBC W/ AUTO DIFFERENTIAL    Narrative:     The following orders were created for panel order CBC auto differential.  Procedure                               Abnormality          Status                     ---------                               -----------         ------                     CBC with Differential[[id number]]        Abnormal            Final result                 Please view results for these tests on the individual orders.   HIGH SENSITIVITY CRP   POCT GLUCOSE, HAND-HELD DEVICE     EKG Readings: (Independently Interpreted)   Initial Reading: No STEMI. Rhythm: Normal Sinus Rhythm. Heart Rate: 104. Ectopy: PACs. Conduction: Normal. ST Segments: Normal ST Segments. T Waves: Normal. Clinical Impression: Normal Sinus Rhythm Other Impression: Sinus tachycardia with PAC, no STEMI noted     Imaging Results              CT Hand Without Contrast Left (Final result)  Result time 01/04/23 18:52:30      Final result by Gaurang Choudhury MD (01/04/23 18:52:30)                   Impression:      1. Motion artifact and suboptimal positioning could easily obscure a new focal osseous finding.  2. Again noted is demineralization in the distal phalanx of the middle finger.  3. Again noted is a deformity in the distal interphalangeal joint of the 5th finger.  4. Diffuse soft tissue edema.      Electronically signed by: Gaurang Choudhury MD  Date:    01/04/2023  Time:    18:52               Narrative:    EXAMINATION:  CT HAND WITHOUT CONTRAST LEFT    CLINICAL HISTORY:  Osteomyelitis, hand;    TECHNIQUE:  Axial, helical imaging of the left hand was performed.  Axial, sagittal and coronal images were generated.  Soft tissue and bone algorithms were applied.  Automated exposure control was utilized to limit radiation exposure to the patient.   Total DLP for this study was 56 mgycm.  Comparison is made to the left hand radiographs dated 12/11/2022.    COMPARISON:  No prior pertinent study is currently available.    FINDINGS:  This study was degraded by motion artifact.  Additionally, the patient's hand is suboptimally positioned.  Again noted is a deformity in the distal interphalangeal joint of the  5th finger.  Demineralization in the distal phalanx of the middle finger is again noted.  There is no new osseous finding.  There is diffuse soft tissue edema.                                       Medications   sodium chloride 0.9% bolus 1,000 mL 1,000 mL (0 mLs Intravenous Stopped 1/4/23 1800)   clindamycin injection 600 mg (600 mg Intramuscular Given 1/4/23 1934)   ketorolac injection 30 mg (30 mg Intravenous Given 1/4/23 1934)     Medical Decision Making:   History:   Old Medical Records: I decided to obtain old medical records.  Old Records Summarized: other records.       <> Summary of Records: Patient with history chronic infection of his left hand.  Patient noncompliant diabetic.  Initial Assessment:   56 yo male with hx of DM and HIV noncompliants presents to ED for evaluation of left middle finger pain and drainage.  Denies any fever at home.  States he is noncompliant with his diabetes medication.  Reports to having mild dizziness today.  Differential Diagnosis:   DKA, hyperglycemia, dizziness, CAD, finger infection, osteomyelitis  Independently Interpreted Test(s):   I have ordered and independently interpreted EKG Reading(s) - see prior notes  Clinical Tests:   Lab Tests: Ordered and Reviewed       <> Summary of Lab: Low leukocytosis of 16 with elevated sed rate greater than 140  Radiological Study: Ordered and Reviewed  ED Management:  56 yo male with hx of DM and HIV presents to ED for evaluation of left middle finger pain and swelling.   History obtained from EMS or family:  Patient   Co-morbidities and/or factors adding to the complexity or risk for the patient?: DM, HIV, noncompliance  Differential diagnoses:  Hyperglycemia, DKA, osteomyelitis, cellulitis, joint infection   Decision to obtain previous or outside records?: previous hospital records reviewed.   Review of RX medications/new RX prescribed by me?: Discussed starting on bactrim, given dose of IV clindamycin while in ED due to penicillin  allergry.   My EKG Interpretation: see above  Labs/imaging/other tests obtained/considered (risk/benefits of testing discussed):  Leukocytosis noted with elevated sed rate.  CT obtained with no signs of osteomyelitis.  Sugar elevated in 370s with anion gap of 11. no signs of DKA at this time  Treatment/interventions, IV fluids, IV medications: Patient given liter of fluids and toradol. States feeling better at this time.   Workup/treatment affected by social determinants of health?:  Noncompliance, access to care, and transport limitation  Shared decision making: Performed shared decision making with patient. Will treat with antibiotics at this time. Return to ED precautions given.   Discussed smoking sensation  Dispo: discharge home.                           Clinical Impression:   Final diagnoses:  [R42] Dizziness  [L08.9] Infection of finger (Primary)  [R73.9] Hyperglycemia        ED Disposition Condition    Discharge Stable          ED Prescriptions       Medication Sig Dispense Start Date End Date Auth. Provider    sulfamethoxazole-trimethoprim 800-160mg (BACTRIM DS) 800-160 mg Tab Take 1 tablet by mouth 2 (two) times daily. for 7 days 14 tablet 1/4/2023 1/11/2023 JUAN R Fan          Follow-up Information       Follow up With Specialties Details Why Contact Info    Yolanda Ho NP Family Medicine   [address] [facility] [address]  [phone]               JUAN R Fan  01/04/23 1941

## 2023-01-12 ENCOUNTER — OFFICE VISIT (OUTPATIENT)
Dept: FAMILY MEDICINE | Facility: CLINIC | Age: 58
End: 2023-01-12
Payer: MEDICARE

## 2023-01-12 VITALS
BODY MASS INDEX: 17.89 KG/M2 | WEIGHT: 135 LBS | HEIGHT: 73 IN | HEART RATE: 99 BPM | TEMPERATURE: 97 F | SYSTOLIC BLOOD PRESSURE: 73 MMHG | RESPIRATION RATE: 20 BRPM | DIASTOLIC BLOOD PRESSURE: 44 MMHG

## 2023-01-12 DIAGNOSIS — I95.9 HYPOTENSION, UNSPECIFIED HYPOTENSION TYPE: ICD-10-CM

## 2023-01-12 DIAGNOSIS — L08.9 FINGER INFECTION: ICD-10-CM

## 2023-01-12 DIAGNOSIS — E11.65 UNCONTROLLED TYPE 2 DIABETES MELLITUS WITH HYPERGLYCEMIA: ICD-10-CM

## 2023-01-12 PROCEDURE — 3078F DIAST BP <80 MM HG: CPT | Mod: ,,, | Performed by: NURSE PRACTITIONER

## 2023-01-12 PROCEDURE — 3008F PR BODY MASS INDEX (BMI) DOCUMENTED: ICD-10-PCS | Mod: ,,, | Performed by: NURSE PRACTITIONER

## 2023-01-12 PROCEDURE — 3008F BODY MASS INDEX DOCD: CPT | Mod: ,,, | Performed by: NURSE PRACTITIONER

## 2023-01-12 PROCEDURE — 99213 OFFICE O/P EST LOW 20 MIN: CPT | Mod: ,,, | Performed by: NURSE PRACTITIONER

## 2023-01-12 PROCEDURE — 1159F MED LIST DOCD IN RCRD: CPT | Mod: ,,, | Performed by: NURSE PRACTITIONER

## 2023-01-12 PROCEDURE — 3078F PR MOST RECENT DIASTOLIC BLOOD PRESSURE < 80 MM HG: ICD-10-PCS | Mod: ,,, | Performed by: NURSE PRACTITIONER

## 2023-01-12 PROCEDURE — 3074F PR MOST RECENT SYSTOLIC BLOOD PRESSURE < 130 MM HG: ICD-10-PCS | Mod: ,,, | Performed by: NURSE PRACTITIONER

## 2023-01-12 PROCEDURE — 99213 PR OFFICE/OUTPT VISIT, EST, LEVL III, 20-29 MIN: ICD-10-PCS | Mod: ,,, | Performed by: NURSE PRACTITIONER

## 2023-01-12 PROCEDURE — 3074F SYST BP LT 130 MM HG: CPT | Mod: ,,, | Performed by: NURSE PRACTITIONER

## 2023-01-12 PROCEDURE — 1159F PR MEDICATION LIST DOCUMENTED IN MEDICAL RECORD: ICD-10-PCS | Mod: ,,, | Performed by: NURSE PRACTITIONER

## 2023-01-12 NOTE — PROGRESS NOTES
The Subjective:       Patient ID: Hakeem Mcdowell is a 57 y.o. male.    Chief Complaint:  Infected 3rd finger left hand    The patient is a 57-year-old brittle diabetic who presents to the office for removal of a mole on his forehead.  We took 1 look at his left hand and surmised that he needs to go the hospital for care.  The patient's blood pressure is 73/44.  The patient's blood sugar is 426.      The patient's hands are contracted fingers are contracted.  The patient's left 3rd finger is about 3 times normal size.  The finger is purple from the anterior view with a purple discoloration covering the superior portion of his hand and some skin peeling off of his hand.  It on the palmar side of his hand the finger has two purulent appearing abscess areas.    Review of Systems   Constitutional:  Positive for fatigue.   HENT: Negative.     Eyes: Negative.    Respiratory: Negative.     Gastrointestinal: Negative.    Endocrine: Negative.    Genitourinary: Negative.    Musculoskeletal: Negative.    Integumentary:         See HPI   Neurological:  Positive for dizziness and light-headedness.   Hematological: Negative.        Objective:      Physical Exam  Vitals and nursing note reviewed.   HENT:      Head: Normocephalic.      Nose: Nose normal.      Mouth/Throat:      Mouth: Mucous membranes are moist.   Eyes:      Extraocular Movements: Extraocular movements intact.   Cardiovascular:      Rate and Rhythm: Normal rate.      Heart sounds: No murmur heard.  Pulmonary:      Effort: Pulmonary effort is normal.      Breath sounds: Normal breath sounds.   Abdominal:      Palpations: Abdomen is soft.   Musculoskeletal:         General: Swelling present.      Cervical back: Normal range of motion and neck supple.   Skin:     Findings: Erythema present.      Comments: Markedly swollen 3rd finger left hand with purplish discoloration, skin peeling, and radiation into superior portion of the hand     Neurological:      Mental  Status: He is alert and oriented to person, place, and time.       Assessment:       Problem List Items Addressed This Visit          Cardiac/Vascular    Hypotension       ID    Finger infection       Endocrine    Uncontrolled diabetes mellitus with hyperglycemia       Plan:     Hypotension  Blood pressure 73/44    Infected 3rd finger left hand  Severe infection with significant swelling, pertinent appearing areas, and discoloration going into the hand with skin sloughing    Uncontrolled diabetes  Patient's blood sugar is 426 per CBG    I am sending this patient to the emergency department at [facility].

## 2023-01-12 NOTE — PROGRESS NOTES
I instructed this patient to go to the emergency room due to the severity of infection in his finger.  I suspect osteomyelitis     I called report to [facility] Emergency Department and spoke with Rula.  I told Rula about his finger, about his hand, his blood sugar level of 426, and his blood pressure of 73/44.

## 2023-01-13 ENCOUNTER — HOSPITAL ENCOUNTER (EMERGENCY)
Facility: HOSPITAL | Age: 58
Discharge: HOME | End: 2023-01-13
Attending: INTERNAL MEDICINE
Payer: MEDICARE

## 2023-01-13 VITALS
HEIGHT: 73 IN | DIASTOLIC BLOOD PRESSURE: 76 MMHG | TEMPERATURE: 97 F | WEIGHT: 135 LBS | OXYGEN SATURATION: 100 % | BODY MASS INDEX: 17.89 KG/M2 | SYSTOLIC BLOOD PRESSURE: 114 MMHG | HEART RATE: 100 BPM | RESPIRATION RATE: 20 BRPM

## 2023-01-13 DIAGNOSIS — S62.623G: ICD-10-CM

## 2023-01-13 DIAGNOSIS — B99.9 CHRONIC INFECTION: ICD-10-CM

## 2023-01-13 DIAGNOSIS — L03.012 CELLULITIS OF LEFT MIDDLE FINGER: Primary | ICD-10-CM

## 2023-01-13 PROCEDURE — 99284 EMERGENCY DEPT VISIT MOD MDM: CPT | Mod: 25

## 2023-01-13 PROCEDURE — 25000003 PHARM REV CODE 250: Performed by: INTERNAL MEDICINE

## 2023-01-13 PROCEDURE — 29130 APPL FINGER SPLINT STATIC: CPT | Mod: F2

## 2023-01-13 RX ORDER — OXYCODONE AND ACETAMINOPHEN 5; 325 MG/1; MG/1
2 TABLET ORAL
Status: COMPLETED | OUTPATIENT
Start: 2023-01-13 | End: 2023-01-13

## 2023-01-13 RX ORDER — OXYCODONE AND ACETAMINOPHEN 10; 325 MG/1; MG/1
1 TABLET ORAL ONCE
Status: DISCONTINUED | OUTPATIENT
Start: 2023-01-13 | End: 2023-01-13

## 2023-01-13 RX ORDER — ONDANSETRON 4 MG/1
4 TABLET, ORALLY DISINTEGRATING ORAL ONCE
Status: COMPLETED | OUTPATIENT
Start: 2023-01-13 | End: 2023-01-13

## 2023-01-13 RX ORDER — OXYCODONE AND ACETAMINOPHEN 5; 325 MG/1; MG/1
1 TABLET ORAL EVERY 6 HOURS PRN
Qty: 15 TABLET | Refills: 0 | Status: SHIPPED | OUTPATIENT
Start: 2023-01-13

## 2023-01-13 RX ORDER — DOXYCYCLINE 100 MG/1
100 CAPSULE ORAL EVERY 12 HOURS
Qty: 28 CAPSULE | Refills: 0 | Status: SHIPPED | OUTPATIENT
Start: 2023-01-13 | End: 2023-01-27

## 2023-01-13 RX ADMIN — ONDANSETRON 4 MG: 4 TABLET, ORALLY DISINTEGRATING ORAL at 03:01

## 2023-01-13 RX ADMIN — OXYCODONE HYDROCHLORIDE AND ACETAMINOPHEN 2 TABLET: 5; 325 TABLET ORAL at 03:01

## 2023-01-13 NOTE — ED PROVIDER NOTES
Source of History:  Patient, no limitations    Chief complaint:  Hand Pain (Brought per [facility] from home, seen PCP yesterday and has left hand finger infection and told he needs it amputated)      HPI:  Hakeem Mcdowell is a 57 y.o. male presenting with Hand Pain (Brought per [facility] from home, seen PCP yesterday and has left hand finger infection and told he needs it amputated)       58 yo with hx of muscular dystrophy presents with finger infection, onset was over a year ago, progression of symptoms, complicated by contraction of fingers secondary to muscular dystrophy, says he knows he needs his finger amputated as multiple other fingers have suffered same fate.     Patient presents for evaluation of a possible infection of left middle finger. Onset of symptoms was gradual starting over a year ago, with gradually worsening symptoms since that time. Symptoms include severe pain. There is a history of trauma to the area. Treatment to date has included completed round of bactrim ds abx with no relief.        Review of Systems   Constitutional symptoms:  Negative except as documented in HPI.   Skin symptoms:  Negative except as documented in HPI.   HEENT symptoms:  Negative except as documented in HPI.   Respiratory symptoms:  Negative except as documented in HPI.   Cardiovascular symptoms:  Negative except as documented in HPI.   Gastrointestinal symptoms:  Negative except as documented in HPI.    Genitourinary symptoms:  Negative except as documented in HPI.   Musculoskeletal symptoms:  Negative except as documented in HPI.   Neurologic symptoms:  Negative except as documented in HPI.   Psychiatric symptoms:  Negative except as documented in HPI.   Allergy/immunologic symptoms:  Negative except as documented in HPI.             Additional review of systems information: All other systems reviewed and otherwise negative.      Review of patient's allergies indicates:   Allergen Reactions    Penicillins Swelling and  "Rash       PMH:  As per HPI and below:    Past Medical History:   Diagnosis Date    COPD (chronic obstructive pulmonary disease)     Diabetes mellitus     Human immunodeficiency virus (HIV) disease         No family history on file.    Past Surgical History:   Procedure Laterality Date    FINGER AMPUTATION Right        Social History     Tobacco Use    Smoking status: Every Day     Packs/day: 0.50     Types: Cigarettes    Smokeless tobacco: Never       Patient Active Problem List   Diagnosis    COPD mixed type    Wheezing    Otitis externa of left ear    Muscle spasticity    Need for assistance due to unsteady gait    Contracture of hand joint    Uncontrolled diabetes mellitus with hyperglycemia    Pain of left hand    Nicotine use    Pain    Bilateral leg pain    Finger infection    Hypotension        Physical Exam:    /76   Pulse 100   Temp 97.2 °F (36.2 °C) (Tympanic)   Resp 20   Ht 6' 1" (1.854 m)   Wt 61.2 kg (135 lb)   SpO2 100%   BMI 17.81 kg/m²     Nursing note and vital signs reviewed.    General:  Alert, uncomfortable, obvious pain  Skin: left middle finger chronic appearing infection with mild purulent drainage, No cyanosis  HEENT: Normocephalic and atraumatic, Vision unchanged, Pupils symmetric, No icterus , Nasal mucosa is pink and moist  Cardiovascular:  Regular rate and rhythm, No edema  Chest Wall: No deformity, equal chest rise  Respiratory:  Lungs are clear to auscultation, respirations are non-labored.    Musculoskeletal:  contracted hands, multiple missing fingers due to prior amputations, Normal perfusion to all extremities  Gastrointestinal:  Soft, Non distended  Neurological:  Alert and oriented, decreased muscle tone due to MD, normal speech observed.    Psychiatric:  Cooperative, appropriate mood & affect.        Labs that have been ordered have been independently reviewed and interpreted by myself.     Old Chart Reviewed.      Initial Impression/ Differential Dx:  Cellulitis, " abscess, osteomyelitis, septic joint, MRSA, allergic/contact dermatitis, local trauma/contusion      MDM:      Reviewed Nurses Note.    Reviewed Pertinent old records.    Orders Placed This Encounter    X-Ray Hand 3 view Left    Ambulatory referral/consult to Orthopedics    Ambulatory referral/consult to General Surgery    Application finger splint static    ondansetron disintegrating tablet 4 mg    oxyCODONE-acetaminophen 5-325 mg per tablet 2 tablet    oxyCODONE-acetaminophen (PERCOCET) 5-325 mg per tablet    doxycycline (MONODOX) 100 MG capsule                    Labs Reviewed - No data to display       X-Ray Hand 3 view Left   ED Interpretation   Abnormal   Moderate progression      Final Result      1. Interim increasing soft tissue swelling at the 3rd finger with bony destruction at the 3rd DIP joint.  A underlying infectious/inflammatory process/osteomyelitis and cellulitis must be considered   2. Persistent flexion of the fingers   3. Osteopenia   4. Deformity throughout the DIP joints   5. MR examination would allow further evaluation if clinically indicated         Electronically signed by: Chele Ludwig   Date:    01/13/2023   Time:    16:23           No visits with results within 1 Day(s) from this visit.   Latest known visit with results is:   Admission on 01/04/2023, Discharged on 01/04/2023   Component Date Value Ref Range Status    Sodium Level 01/04/2023 135 (L)  136 - 145 mmol/L Final    Potassium Level 01/04/2023 4.1  3.5 - 5.1 mmol/L Final    Chloride 01/04/2023 91 (L)  98 - 107 mmol/L Final    Carbon Dioxide 01/04/2023 33 (H)  22 - 29 mmol/L Final    Glucose Level 01/04/2023 378 (H)  74 - 100 mg/dL Final    Blood Urea Nitrogen 01/04/2023 8.0 (L)  8.4 - 25.7 mg/dL Final    Creatinine 01/04/2023 0.76  0.73 - 1.18 mg/dL Final    Calcium Level Total 01/04/2023 8.7  8.4 - 10.2 mg/dL Final    Protein Total 01/04/2023 8.1  6.4 - 8.3 gm/dL Final    Albumin Level 01/04/2023 2.1 (L)  3.5 - 5.0 g/dL Final     Globulin 01/04/2023 6.0 (H)  2.4 - 3.5 gm/dL Final    Albumin/Globulin Ratio 01/04/2023 0.4 (L)  1.1 - 2.0 ratio Final    Bilirubin Total 01/04/2023 0.2  <=1.5 mg/dL Final    Alkaline Phosphatase 01/04/2023 109  40 - 150 unit/L Final    Alanine Aminotransferase 01/04/2023 6  0 - 55 unit/L Final    Aspartate Aminotransferase 01/04/2023 9  5 - 34 unit/L Final    eGFR 01/04/2023 >90  mls/min/1.73/m2 Final    Sed Rate 01/04/2023 >140 (H)  0 - 15 mm/hr Final    C-Reactive Protein High Sensitivity 01/04/2023 103.08 (H)  <=5.00 mg/L Final    WBC 01/04/2023 16.0 (H)  4.5 - 11.5 x10(3)/mcL Final    RBC 01/04/2023 3.63 (L)  4.70 - 6.10 x10(6)/mcL Final    Hgb 01/04/2023 9.4 (L)  14.0 - 18.0 gm/dL Final    Hct 01/04/2023 29.1 (L)  42.0 - 52.0 % Final    MCV 01/04/2023 80.2  80.0 - 94.0 fL Final    MCH 01/04/2023 25.9  pg Final    MCHC 01/04/2023 32.3 (L)  33.0 - 36.0 mg/dL Final    RDW 01/04/2023 13.6  11.6 - 14.4 % Final    Platelet 01/04/2023 652 (H)  140 - 371 x10(3)/mcL Final    MPV 01/04/2023 7.8 (L)  9.4 - 12.4 fL Final    Neut % 01/04/2023 77.3  % Final    Lymph % 01/04/2023 16.2  % Final    Mono % 01/04/2023 5.3  % Final    Eos % 01/04/2023 0.4  % Final    Basophil % 01/04/2023 0.3  % Final    Lymph # 01/04/2023 2.59  0.6 - 4.6 x10(3)/mcL Final    Neut # 01/04/2023 12.39 (H)  2.1 - 9.2 x10(3)/mcL Final    Mono # 01/04/2023 0.85  0.1 - 1.3 x10(3)/mcL Final    Eos # 01/04/2023 0.06  0 - 0.9 x10(3)/mcL Final    Baso # 01/04/2023 0.05  0 - 0.2 x10(3)/mcL Final    IG# 01/04/2023 0.08 (H)  0 - 0.04 x10(3)/mcL Final    IG% 01/04/2023 0.5  % Final    Troponin-I 01/04/2023 0.038  0.000 - 0.045 ng/mL Final    POCT Glucose 01/04/2023 404 (H)  70 - 110 mg/dL Final       Imaging Results              X-Ray Hand 3 view Left (Final result)  Result time 01/13/23 16:23:12      Final result by Chele Ludwig MD (01/13/23 16:23:12)                   Impression:      1. Interim increasing soft tissue swelling at the 3rd finger with  bony destruction at the 3rd DIP joint.  A underlying infectious/inflammatory process/osteomyelitis and cellulitis must be considered  2. Persistent flexion of the fingers  3. Osteopenia  4. Deformity throughout the DIP joints  5. MR examination would allow further evaluation if clinically indicated      Electronically signed by: Chele Ludwig  Date:    01/13/2023  Time:    16:23               Narrative:    EXAMINATION:  XR HAND COMPLETE 3 VIEW LEFT    CLINICAL HISTORY:  ; hand pain;.    COMPARISON:  12/11/2022    FINDINGS:  AP, lateral, and oblique views revealpersistent flexion of the fingers.  There is again deformity at the DIP joints at.  There is asymmetric soft tissue swelling at the 3rd finger which has increased since the prior exam a few scattered punctate bony calcifications are identified with suspect increasing deformity at the base of the 3rd distal phalanx.  Bony structures are osteopenic.                        ED Interpretation by Daniel Fuchs DO (01/13/23 15:58:03, [facility] - Emergency Dept, Emergency Medicine) Flagged as Abnormal    Moderate progression                                                                         Diagnostic Impression:    1. Cellulitis of left middle finger    2. Closed displaced fracture of middle phalanx of left middle finger with delayed healing, subsequent encounter    3. Chronic infection         ED Disposition Condition    Discharge Stable             Follow-up Information       [facility] - Emergency Dept.    Specialty: Emergency Medicine  Why: If symptoms worsen  Contact information:  [address] [facility] [id number] [phone]             Call  Baltazar Rico MD.    Specialty: Orthopedic Surgery  Contact information:  [address] [facility] [address]  [phone]               Call  Marko Castañeda MD.    Specialties: General Surgery, Cardiology  Contact information:  [address]  [place]  [facility] [address]  [facility] [address]  [phone]                              ED Prescriptions       Medication Sig Dispense Start Date End Date Auth. Provider    oxyCODONE-acetaminophen (PERCOCET) 5-325 mg per tablet Take 1 tablet by mouth every 6 (six) hours as needed for Pain. 15 tablet 1/13/2023 -- Daniel Fuchs DO    doxycycline (MONODOX) 100 MG capsule Take 1 capsule (100 mg total) by mouth every 12 (twelve) hours. for 14 days 28 capsule 1/13/2023 1/27/2023 Daniel Fuchs DO          Follow-up Information       Follow up With Specialties Details Why Contact Info    [facility] - Emergency Dept Emergency Medicine  If symptoms worsen [address]  [facility] [id number] [phone]    Baltazar Rico MD Orthopedic Surgery Call   [address] [facility]  [place] [address]  [phone]      Marko Castañeda MD General Surgery, Cardiology Call   [address]  [facility] [address]  [phone]               Daniel Fuchs DO  01/14/23 0748

## 2023-01-17 ENCOUNTER — OFFICE VISIT (OUTPATIENT)
Dept: FAMILY MEDICINE | Facility: CLINIC | Age: 58
End: 2023-01-17
Payer: MEDICARE

## 2023-01-17 VITALS
WEIGHT: 143 LBS | SYSTOLIC BLOOD PRESSURE: 144 MMHG | HEART RATE: 105 BPM | HEIGHT: 73 IN | BODY MASS INDEX: 18.95 KG/M2 | DIASTOLIC BLOOD PRESSURE: 75 MMHG

## 2023-01-17 DIAGNOSIS — R52 PAIN: ICD-10-CM

## 2023-01-17 DIAGNOSIS — E11.69 TYPE 2 DIABETES MELLITUS WITH OTHER SPECIFIED COMPLICATION, UNSPECIFIED WHETHER LONG TERM INSULIN USE: ICD-10-CM

## 2023-01-17 DIAGNOSIS — D22.9 BENIGN PIGMENTED MOLE: ICD-10-CM

## 2023-01-17 DIAGNOSIS — J44.9 CHRONIC OBSTRUCTIVE PULMONARY DISEASE, UNSPECIFIED COPD TYPE: Primary | ICD-10-CM

## 2023-01-17 DIAGNOSIS — L08.9 INFECTION OF FINGER: ICD-10-CM

## 2023-01-17 DIAGNOSIS — R05.9 COUGH, UNSPECIFIED TYPE: ICD-10-CM

## 2023-01-17 PROCEDURE — 99213 OFFICE O/P EST LOW 20 MIN: CPT | Mod: 25,,, | Performed by: NURSE PRACTITIONER

## 2023-01-17 PROCEDURE — 17000 DESTRUCT PREMALG LESION: CPT | Mod: ,,, | Performed by: NURSE PRACTITIONER

## 2023-01-17 PROCEDURE — 3008F PR BODY MASS INDEX (BMI) DOCUMENTED: ICD-10-PCS | Mod: ,,, | Performed by: NURSE PRACTITIONER

## 2023-01-17 PROCEDURE — 3077F SYST BP >= 140 MM HG: CPT | Mod: ,,, | Performed by: NURSE PRACTITIONER

## 2023-01-17 PROCEDURE — 99213 PR OFFICE/OUTPT VISIT, EST, LEVL III, 20-29 MIN: ICD-10-PCS | Mod: 25,,, | Performed by: NURSE PRACTITIONER

## 2023-01-17 PROCEDURE — 3077F PR MOST RECENT SYSTOLIC BLOOD PRESSURE >= 140 MM HG: ICD-10-PCS | Mod: ,,, | Performed by: NURSE PRACTITIONER

## 2023-01-17 PROCEDURE — 3008F BODY MASS INDEX DOCD: CPT | Mod: ,,, | Performed by: NURSE PRACTITIONER

## 2023-01-17 PROCEDURE — 17000 PR DESTRUCTION(LASER SURGERY,CRYOSURGERY,CHEMOSURGERY),PREMALIGNANT LESIONS,FIRST LESION: ICD-10-PCS | Mod: ,,, | Performed by: NURSE PRACTITIONER

## 2023-01-17 PROCEDURE — 3078F PR MOST RECENT DIASTOLIC BLOOD PRESSURE < 80 MM HG: ICD-10-PCS | Mod: ,,, | Performed by: NURSE PRACTITIONER

## 2023-01-17 PROCEDURE — 3078F DIAST BP <80 MM HG: CPT | Mod: ,,, | Performed by: NURSE PRACTITIONER

## 2023-01-17 RX ORDER — PROMETHAZINE HYDROCHLORIDE 6.25 MG/5ML
6.25 SYRUP ORAL EVERY 6 HOURS PRN
Qty: 120 ML | Refills: 0 | Status: SHIPPED | OUTPATIENT
Start: 2023-01-17 | End: 2023-01-27

## 2023-01-17 RX ORDER — KETOROLAC TROMETHAMINE 30 MG/ML
60 INJECTION, SOLUTION INTRAMUSCULAR; INTRAVENOUS
Status: COMPLETED | OUTPATIENT
Start: 2023-01-17 | End: 2023-01-17

## 2023-01-17 RX ORDER — ALBUTEROL SULFATE 90 UG/1
2 AEROSOL, METERED RESPIRATORY (INHALATION) EVERY 6 HOURS PRN
Qty: 18 G | Refills: 0 | Status: SHIPPED | OUTPATIENT
Start: 2023-01-17 | End: 2023-02-01 | Stop reason: SDUPTHER

## 2023-01-17 RX ADMIN — KETOROLAC TROMETHAMINE 60 MG: 30 INJECTION, SOLUTION INTRAMUSCULAR; INTRAVENOUS at 12:01

## 2023-01-17 NOTE — PROGRESS NOTES
"School Subjective:       Patient ID: Hakeem Mcdowell is a 57 y.o. male.    Chief Complaint: Headache (Patient complains of growth to head would like it froze off. )      Home the patient is a 57-year-old male with a history of severe diabetes, HIV positive, substance abuse history and patient states he has MS in his history.  The patient has COPD and continues to smoke cigarettes.    The patient has contractures to hands and fingers on both hands.  The patient has severe infection in the 3rd finger left hand.  The patient went to the ED on the 13 is at my recommendation.  The patient was sent home on oral antibiotics and was given narcotic pain medication.  Today the patient presents to the office for removal of a mole on his forehead.  He he tells me that he is seeing his surgeon when on Thursday the 19th.  He states he was told by the emergency room staff that there are no hospital rooms "anywhere in the [facility]".  Blood work shows the patient has an elevated white count of 16.      We tried to get a capillary blood glucose reading and it came back that his blood sugars too high to read on the machine.    I, Dr. Kaushal Black, collaborate with Yolanda Ho NP about the patient's history of Diabetes and the treatment plan including medication.    Review of Systems   Constitutional: Negative.    HENT: Negative.     Eyes: Negative.    Respiratory:  Positive for shortness of breath and wheezing.    Gastrointestinal: Negative.    Endocrine: Positive for polydipsia and polyuria.   Genitourinary: Negative.    Musculoskeletal:  Positive for joint deformity.   Integumentary:  Positive for wound.       Objective:      Physical Exam  HENT:      Head: Normocephalic.      Nose: Nose normal.      Mouth/Throat:      Mouth: Mucous membranes are moist.   Eyes:      Extraocular Movements: Extraocular movements intact.   Cardiovascular:      Rate and Rhythm: Normal rate and regular rhythm.      Heart sounds: No murmur " heard.  Pulmonary:      Effort: Pulmonary effort is normal.      Breath sounds: Wheezing present.   Abdominal:      General: Bowel sounds are normal.      Palpations: Abdomen is soft.   Musculoskeletal:         General: Normal range of motion.      Cervical back: Normal range of motion and neck supple.   Skin:     General: Skin is warm and dry.   Neurological:      Mental Status: He is alert and oriented to person, place, and time.       Assessment:       Problem List Items Addressed This Visit          Derm    Benign pigmented mole       ID    Infection of finger       Endocrine    Type 2 diabetes mellitus with other specified complication       Plan:     Benign pigmented mole:  * Procedure Note *  Consent:  Signed consent placed in chart.  I reviewed the risk of procedure with patient.  Patient consented to procedure and had opportunity to ask questions.   Area prepped in sterile fashion  Fenestrated drape use to avoid eye area.  Cryotherapy therapy performed to site of pigmented mole.    Patient tolerated procedure well  No complications noted    Infection of finger:  We removed the old dressing.  Foul odor noted.  Cleansed the finger with sterile normal saline  Pat dry  Apply Betadine  Cover with a dry sterile dressing.      Type 2 diabetes:  Patient instructed to check his blood sugar at home and to call if his sugar levels are high so we can order regular insulin.   I spoke with Christine at [facility] who said that they will admit the patient tomorrow.    Call office with any questions or concerns

## 2023-01-30 ENCOUNTER — PATIENT MESSAGE (OUTPATIENT)
Dept: ADMINISTRATIVE | Facility: HOSPITAL | Age: 58
End: 2023-01-30
Payer: MEDICARE

## 2023-02-01 DIAGNOSIS — J44.9 CHRONIC OBSTRUCTIVE PULMONARY DISEASE, UNSPECIFIED COPD TYPE: ICD-10-CM

## 2023-02-01 DIAGNOSIS — R05.9 COUGH, UNSPECIFIED TYPE: ICD-10-CM

## 2023-02-01 RX ORDER — ALBUTEROL SULFATE 90 UG/1
2 AEROSOL, METERED RESPIRATORY (INHALATION) EVERY 6 HOURS PRN
Qty: 18 G | Refills: 3 | Status: SHIPPED | OUTPATIENT
Start: 2023-02-01 | End: 2024-02-01

## 2023-02-08 ENCOUNTER — TELEPHONE (OUTPATIENT)
Dept: FAMILY MEDICINE | Facility: CLINIC | Age: 58
End: 2023-02-08
Payer: MEDICARE

## 2023-02-28 ENCOUNTER — PATIENT MESSAGE (OUTPATIENT)
Dept: ADMINISTRATIVE | Facility: HOSPITAL | Age: 58
End: 2023-02-28
Payer: MEDICARE

## 2023-03-03 ENCOUNTER — DOCUMENTATION ONLY (OUTPATIENT)
Dept: ADMINISTRATIVE | Facility: HOSPITAL | Age: 58
End: 2023-03-03
Payer: MEDICARE

## 2023-03-23 ENCOUNTER — PATIENT OUTREACH (OUTPATIENT)
Dept: INTERNAL MEDICINE | Facility: CLINIC | Age: 58
End: 2023-03-23
Payer: MEDICARE

## 2023-04-25 ENCOUNTER — PATIENT MESSAGE (OUTPATIENT)
Dept: RESEARCH | Facility: HOSPITAL | Age: 58
End: 2023-04-25
Payer: MEDICARE

## 2023-05-02 ENCOUNTER — PATIENT MESSAGE (OUTPATIENT)
Dept: RESEARCH | Facility: HOSPITAL | Age: 58
End: 2023-05-02
Payer: MEDICARE

## 2023-11-27 ENCOUNTER — HOSPITAL ENCOUNTER (EMERGENCY)
Facility: HOSPITAL | Age: 58
Discharge: HOME | End: 2023-11-27
Attending: GENERAL ACUTE CARE HOSPITAL
Payer: MEDICARE

## 2023-11-27 VITALS
RESPIRATION RATE: 18 BRPM | TEMPERATURE: 98 F | OXYGEN SATURATION: 98 % | HEART RATE: 92 BPM | SYSTOLIC BLOOD PRESSURE: 127 MMHG | DIASTOLIC BLOOD PRESSURE: 89 MMHG | WEIGHT: 150 LBS | BODY MASS INDEX: 19.79 KG/M2

## 2023-11-27 DIAGNOSIS — M62.50 MUSCULAR ATROPHY, UNSPECIFIED SITE: ICD-10-CM

## 2023-11-27 DIAGNOSIS — R42 DIZZINESS: ICD-10-CM

## 2023-11-27 DIAGNOSIS — D64.9 CHRONIC ANEMIA: ICD-10-CM

## 2023-11-27 DIAGNOSIS — R53.1 WEAKNESS: Primary | ICD-10-CM

## 2023-11-27 DIAGNOSIS — E11.649 UNCONTROLLED TYPE 2 DIABETES MELLITUS WITH HYPOGLYCEMIA, UNSPECIFIED HYPOGLYCEMIA COMA STATUS: ICD-10-CM

## 2023-11-27 LAB
ALBUMIN SERPL-MCNC: 2.6 G/DL (ref 3.5–5)
ALBUMIN/GLOB SERPL: 0.6 RATIO (ref 1.1–2)
ALP SERPL-CCNC: 90 UNIT/L (ref 40–150)
ALT SERPL-CCNC: 10 UNIT/L (ref 0–55)
AST SERPL-CCNC: 19 UNIT/L (ref 5–34)
BASOPHILS # BLD AUTO: 0.09 X10(3)/MCL
BASOPHILS NFR BLD AUTO: 0.8 %
BILIRUB SERPL-MCNC: 0.2 MG/DL
BNP BLD-MCNC: 60.3 PG/ML
BUN SERPL-MCNC: 23 MG/DL (ref 8.4–25.7)
CALCIUM SERPL-MCNC: 8.5 MG/DL (ref 8.4–10.2)
CHLORIDE SERPL-SCNC: 98 MMOL/L (ref 98–107)
CO2 SERPL-SCNC: 30 MMOL/L (ref 22–29)
CREAT SERPL-MCNC: 1.13 MG/DL (ref 0.73–1.18)
EOSINOPHIL # BLD AUTO: 0.2 X10(3)/MCL (ref 0–0.9)
EOSINOPHIL NFR BLD AUTO: 1.7 %
ERYTHROCYTE [DISTWIDTH] IN BLOOD BY AUTOMATED COUNT: 15.9 % (ref 11.5–17)
ETHANOL SERPL-MCNC: <10 MG/DL
FLUAV AG UPPER RESP QL IA.RAPID: NOT DETECTED
FLUBV AG UPPER RESP QL IA.RAPID: NOT DETECTED
GFR SERPLBLD CREATININE-BSD FMLA CKD-EPI: >60 MLS/MIN/1.73/M2
GLOBULIN SER-MCNC: 4.7 GM/DL (ref 2.4–3.5)
GLUCOSE SERPL-MCNC: 217 MG/DL (ref 74–100)
HCT VFR BLD AUTO: 29.1 % (ref 42–52)
HGB BLD-MCNC: 9.3 G/DL (ref 14–18)
IMM GRANULOCYTES # BLD AUTO: 0.06 X10(3)/MCL (ref 0–0.04)
IMM GRANULOCYTES NFR BLD AUTO: 0.5 %
LACTATE SERPL-SCNC: 1 MMOL/L (ref 0.5–2.2)
LYMPHOCYTES # BLD AUTO: 2.95 X10(3)/MCL (ref 0.6–4.6)
LYMPHOCYTES NFR BLD AUTO: 25.7 %
MAGNESIUM SERPL-MCNC: 1.6 MG/DL (ref 1.6–2.6)
MCH RBC QN AUTO: 25.3 PG (ref 27–31)
MCHC RBC AUTO-ENTMCNC: 32 G/DL (ref 33–36)
MCV RBC AUTO: 79.1 FL (ref 80–94)
MONOCYTES # BLD AUTO: 0.69 X10(3)/MCL (ref 0.1–1.3)
MONOCYTES NFR BLD AUTO: 6 %
NEUTROPHILS # BLD AUTO: 7.49 X10(3)/MCL (ref 2.1–9.2)
NEUTROPHILS NFR BLD AUTO: 65.3 %
PLATELET # BLD AUTO: 463 X10(3)/MCL (ref 130–400)
PMV BLD AUTO: 8.4 FL (ref 7.4–10.4)
POTASSIUM SERPL-SCNC: 4.1 MMOL/L (ref 3.5–5.1)
PROT SERPL-MCNC: 7.3 GM/DL (ref 6.4–8.3)
RBC # BLD AUTO: 3.68 X10(6)/MCL (ref 4.7–6.1)
RSV A 5' UTR RNA NPH QL NAA+PROBE: NOT DETECTED
SARS-COV-2 RNA RESP QL NAA+PROBE: NOT DETECTED
SODIUM SERPL-SCNC: 136 MMOL/L (ref 136–145)
TROPONIN I SERPL-MCNC: 0.02 NG/ML (ref 0–0.04)
TSH SERPL-ACNC: 1.12 UIU/ML (ref 0.35–4.94)
WBC # SPEC AUTO: 11.48 X10(3)/MCL (ref 4.5–11.5)

## 2023-11-27 PROCEDURE — 83605 ASSAY OF LACTIC ACID: CPT | Performed by: GENERAL ACUTE CARE HOSPITAL

## 2023-11-27 PROCEDURE — 93005 ELECTROCARDIOGRAM TRACING: CPT

## 2023-11-27 PROCEDURE — 93010 ELECTROCARDIOGRAM REPORT: CPT | Mod: ,,, | Performed by: STUDENT IN AN ORGANIZED HEALTH CARE EDUCATION/TRAINING PROGRAM

## 2023-11-27 PROCEDURE — 84484 ASSAY OF TROPONIN QUANT: CPT | Performed by: GENERAL ACUTE CARE HOSPITAL

## 2023-11-27 PROCEDURE — 83880 ASSAY OF NATRIURETIC PEPTIDE: CPT | Performed by: GENERAL ACUTE CARE HOSPITAL

## 2023-11-27 PROCEDURE — 25000003 PHARM REV CODE 250: Performed by: GENERAL ACUTE CARE HOSPITAL

## 2023-11-27 PROCEDURE — 99285 EMERGENCY DEPT VISIT HI MDM: CPT | Mod: 25

## 2023-11-27 PROCEDURE — 96374 THER/PROPH/DIAG INJ IV PUSH: CPT

## 2023-11-27 PROCEDURE — 93010 EKG 12-LEAD: ICD-10-PCS | Mod: ,,, | Performed by: STUDENT IN AN ORGANIZED HEALTH CARE EDUCATION/TRAINING PROGRAM

## 2023-11-27 PROCEDURE — 80053 COMPREHEN METABOLIC PANEL: CPT | Performed by: GENERAL ACUTE CARE HOSPITAL

## 2023-11-27 PROCEDURE — 0241U COVID/RSV/FLU A&B PCR: CPT | Performed by: GENERAL ACUTE CARE HOSPITAL

## 2023-11-27 PROCEDURE — 96361 HYDRATE IV INFUSION ADD-ON: CPT

## 2023-11-27 PROCEDURE — 83735 ASSAY OF MAGNESIUM: CPT | Performed by: GENERAL ACUTE CARE HOSPITAL

## 2023-11-27 PROCEDURE — 84443 ASSAY THYROID STIM HORMONE: CPT | Performed by: GENERAL ACUTE CARE HOSPITAL

## 2023-11-27 PROCEDURE — 82077 ASSAY SPEC XCP UR&BREATH IA: CPT | Performed by: GENERAL ACUTE CARE HOSPITAL

## 2023-11-27 PROCEDURE — 85025 COMPLETE CBC W/AUTO DIFF WBC: CPT | Performed by: GENERAL ACUTE CARE HOSPITAL

## 2023-11-27 PROCEDURE — 63600175 PHARM REV CODE 636 W HCPCS: Performed by: GENERAL ACUTE CARE HOSPITAL

## 2023-11-27 RX ORDER — KETOROLAC TROMETHAMINE 30 MG/ML
30 INJECTION, SOLUTION INTRAMUSCULAR; INTRAVENOUS
Status: COMPLETED | OUTPATIENT
Start: 2023-11-27 | End: 2023-11-27

## 2023-11-27 RX ADMIN — KETOROLAC TROMETHAMINE 30 MG: 30 INJECTION, SOLUTION INTRAMUSCULAR at 05:11

## 2023-11-27 RX ADMIN — SODIUM CHLORIDE 500 ML: 9 INJECTION, SOLUTION INTRAVENOUS at 05:11

## 2023-11-27 NOTE — DISCHARGE INSTRUCTIONS
See a neurologist for followup and further care.    If you already have a neurologist followup with him and let him adjust meds or change as needed.    If you do not have one, please get your family doc to refer you to one. or may be you can call the nubmers provided below.    [facility] [address] [place] [address]    [phone]        Take medicines as prescribed    See your family doctor in one to 2 days for further evaluation, workup, and treatment as necessary    Avoid driving or operating machinery while taking medicines as some medicines might cause drowsiness and may cause problems. Also pain medicines have potential of being addictive  so use Pain meds specially Narcotics Sparingly.    The exam and treatment you received in Emergency Room was for an urgent problem and NOT INTENDED AS COMPLETE CARE. It is important that you FOLLOW UP with a doctor for ongoing care. If your symptoms become WORSE or you DO NOT IMPROVE and you are unable to reach your health care provider, you should RETURN to the emergency department. The Emergency Room doctor has provided a PRELIMINARY INTERPRETATION of all your STUDIES. A final interpretation may be done after you are discharged. IF A CHANGE in your diagnosis or treatment is needed WE WILL CONTACT YOU. It is critical that we have a CURRENT PHONE NUMBER FOR YOU.
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
## 2024-09-05 ENCOUNTER — HOSPITAL ENCOUNTER (EMERGENCY)
Facility: HOSPITAL | Age: 59
Discharge: HOME | End: 2024-09-05
Attending: FAMILY MEDICINE
Payer: MEDICARE

## 2024-09-05 VITALS
BODY MASS INDEX: 14.84 KG/M2 | DIASTOLIC BLOOD PRESSURE: 94 MMHG | OXYGEN SATURATION: 95 % | RESPIRATION RATE: 18 BRPM | SYSTOLIC BLOOD PRESSURE: 152 MMHG | TEMPERATURE: 98 F | WEIGHT: 112 LBS | HEART RATE: 89 BPM | HEIGHT: 73 IN

## 2024-09-05 DIAGNOSIS — T50.901A ACCIDENTAL DRUG OVERDOSE, INITIAL ENCOUNTER: Primary | ICD-10-CM

## 2024-09-05 DIAGNOSIS — S09.90XA INJURY OF HEAD, INITIAL ENCOUNTER: ICD-10-CM

## 2024-09-05 DIAGNOSIS — R55 SYNCOPE: ICD-10-CM

## 2024-09-05 LAB
ALBUMIN SERPL-MCNC: 2.7 G/DL (ref 3.4–4.8)
ALBUMIN/GLOB SERPL: 0.6 RATIO (ref 1.1–2)
ALP SERPL-CCNC: 92 UNIT/L (ref 40–150)
ALT SERPL-CCNC: 13 UNIT/L (ref 0–55)
AMPHET UR QL SCN: POSITIVE
ANION GAP SERPL CALC-SCNC: 8 MEQ/L
AST SERPL-CCNC: 14 UNIT/L (ref 5–34)
BACTERIA #/AREA URNS AUTO: NORMAL /HPF
BARBITURATE SCN PRESENT UR: NEGATIVE
BASOPHILS # BLD AUTO: 0.06 X10(3)/MCL
BASOPHILS NFR BLD AUTO: 0.5 %
BENZODIAZ UR QL SCN: POSITIVE
BILIRUB SERPL-MCNC: 0.2 MG/DL
BILIRUB UR QL STRIP.AUTO: NEGATIVE
BUN SERPL-MCNC: 21 MG/DL (ref 8.4–25.7)
CALCIUM SERPL-MCNC: 9 MG/DL (ref 8.8–10)
CANNABINOIDS UR QL SCN: POSITIVE
CHLORIDE SERPL-SCNC: 102 MMOL/L (ref 98–107)
CK MB SERPL-MCNC: 6.3 NG/ML
CK SERPL-CCNC: 210 U/L (ref 30–200)
CLARITY UR: CLEAR
CO2 SERPL-SCNC: 32 MMOL/L (ref 23–31)
COCAINE UR QL SCN: NEGATIVE
COLOR UR AUTO: YELLOW
CREAT SERPL-MCNC: 0.81 MG/DL (ref 0.73–1.18)
CREAT/UREA NIT SERPL: 26
EOSINOPHIL # BLD AUTO: 0.21 X10(3)/MCL (ref 0–0.9)
EOSINOPHIL NFR BLD AUTO: 1.6 %
ERYTHROCYTE [DISTWIDTH] IN BLOOD BY AUTOMATED COUNT: 14.9 % (ref 11.5–17)
ETHANOL SERPL-MCNC: <10 MG/DL
FENTANYL UR QL SCN: POSITIVE
GFR SERPLBLD CREATININE-BSD FMLA CKD-EPI: >60 ML/MIN/1.73/M2
GLOBULIN SER-MCNC: 4.5 GM/DL (ref 2.4–3.5)
GLUCOSE SERPL-MCNC: 306 MG/DL (ref 82–115)
GLUCOSE UR QL STRIP: ABNORMAL
HCT VFR BLD AUTO: 29.9 % (ref 42–52)
HGB BLD-MCNC: 9.6 G/DL (ref 14–18)
HGB UR QL STRIP: ABNORMAL
IMM GRANULOCYTES # BLD AUTO: 0.04 X10(3)/MCL (ref 0–0.04)
IMM GRANULOCYTES NFR BLD AUTO: 0.3 %
KETONES UR QL STRIP: NEGATIVE
LEUKOCYTE ESTERASE UR QL STRIP: NEGATIVE
LYMPHOCYTES # BLD AUTO: 2.65 X10(3)/MCL (ref 0.6–4.6)
LYMPHOCYTES NFR BLD AUTO: 20.2 %
MCH RBC QN AUTO: 27 PG (ref 27–31)
MCHC RBC AUTO-ENTMCNC: 32.1 G/DL (ref 33–36)
MCV RBC AUTO: 84 FL (ref 80–94)
MDMA UR QL SCN: NEGATIVE
MONOCYTES # BLD AUTO: 0.79 X10(3)/MCL (ref 0.1–1.3)
MONOCYTES NFR BLD AUTO: 6 %
NEUTROPHILS # BLD AUTO: 9.38 X10(3)/MCL (ref 2.1–9.2)
NEUTROPHILS NFR BLD AUTO: 71.4 %
NITRITE UR QL STRIP: NEGATIVE
NRBC BLD AUTO-RTO: 0 %
OHS QRS DURATION: 86 MS
OHS QTC CALCULATION: 442 MS
OPIATES UR QL SCN: NEGATIVE
PCP UR QL: NEGATIVE
PH UR STRIP: 6.5 [PH]
PH UR: 6.5 [PH] (ref 3–11)
PLATELET # BLD AUTO: 407 X10(3)/MCL (ref 130–400)
PMV BLD AUTO: 8.5 FL (ref 7.4–10.4)
POTASSIUM SERPL-SCNC: 3.8 MMOL/L (ref 3.5–5.1)
PROT SERPL-MCNC: 7.2 GM/DL (ref 5.8–7.6)
PROT UR QL STRIP: ABNORMAL
RBC # BLD AUTO: 3.56 X10(6)/MCL (ref 4.7–6.1)
RBC #/AREA URNS AUTO: NORMAL /HPF
SODIUM SERPL-SCNC: 142 MMOL/L (ref 136–145)
SP GR UR STRIP.AUTO: 1.02 (ref 1–1.03)
SPECIFIC GRAVITY, URINE AUTO (.000) (OHS): 1.02 (ref 1–1.03)
SQUAMOUS #/AREA URNS AUTO: NORMAL /HPF
TROPONIN I SERPL-MCNC: 0.02 NG/ML (ref 0–0.04)
UROBILINOGEN UR STRIP-ACNC: 0.2
WBC # BLD AUTO: 13.13 X10(3)/MCL (ref 4.5–11.5)
WBC #/AREA URNS AUTO: NORMAL /HPF

## 2024-09-05 PROCEDURE — 82550 ASSAY OF CK (CPK): CPT | Performed by: FAMILY MEDICINE

## 2024-09-05 PROCEDURE — 82077 ASSAY SPEC XCP UR&BREATH IA: CPT | Performed by: FAMILY MEDICINE

## 2024-09-05 PROCEDURE — 93005 ELECTROCARDIOGRAM TRACING: CPT

## 2024-09-05 PROCEDURE — 82553 CREATINE MB FRACTION: CPT | Performed by: FAMILY MEDICINE

## 2024-09-05 PROCEDURE — 81001 URINALYSIS AUTO W/SCOPE: CPT | Mod: XB | Performed by: FAMILY MEDICINE

## 2024-09-05 PROCEDURE — 25000003 PHARM REV CODE 250: Performed by: FAMILY MEDICINE

## 2024-09-05 PROCEDURE — 80053 COMPREHEN METABOLIC PANEL: CPT | Performed by: FAMILY MEDICINE

## 2024-09-05 PROCEDURE — 84484 ASSAY OF TROPONIN QUANT: CPT | Performed by: FAMILY MEDICINE

## 2024-09-05 PROCEDURE — 99900031 HC PATIENT EDUCATION (STAT)

## 2024-09-05 PROCEDURE — 96360 HYDRATION IV INFUSION INIT: CPT

## 2024-09-05 PROCEDURE — 80307 DRUG TEST PRSMV CHEM ANLYZR: CPT | Performed by: FAMILY MEDICINE

## 2024-09-05 PROCEDURE — 99285 EMERGENCY DEPT VISIT HI MDM: CPT | Mod: 25

## 2024-09-05 PROCEDURE — 85025 COMPLETE CBC W/AUTO DIFF WBC: CPT | Performed by: FAMILY MEDICINE

## 2024-09-05 RX ORDER — NALOXONE HYDROCHLORIDE 4 MG/.1ML
1 SPRAY NASAL
Qty: 1 EACH | Refills: 0 | Status: SHIPPED | OUTPATIENT
Start: 2024-09-05

## 2024-09-05 RX ADMIN — SODIUM CHLORIDE 1000 ML: 9 INJECTION, SOLUTION INTRAVENOUS at 04:09
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
## 2024-09-05 NOTE — ED NOTES
[facility] called and gave ETA of 10:00.  Notified patient.  Patient discharged and wanted to wait in lobby to wait for ride.  Asked patient if he obtains a ride else where to let staff know.

## 2024-09-16 ENCOUNTER — HOSPITAL ENCOUNTER (EMERGENCY)
Facility: HOSPITAL | Age: 59
Discharge: PSYCHIATRIC OR SUBSTANCE USE FACILITY | End: 2024-09-17
Attending: EMERGENCY MEDICINE
Payer: MEDICARE

## 2024-09-16 DIAGNOSIS — R45.851 DEPRESSION WITH SUICIDAL IDEATION: ICD-10-CM

## 2024-09-16 DIAGNOSIS — F19.10 POLYSUBSTANCE ABUSE: Primary | ICD-10-CM

## 2024-09-16 DIAGNOSIS — E11.9 TYPE 2 DIABETES MELLITUS WITHOUT COMPLICATION, WITH LONG-TERM CURRENT USE OF INSULIN: ICD-10-CM

## 2024-09-16 DIAGNOSIS — Z79.4 TYPE 2 DIABETES MELLITUS WITHOUT COMPLICATION, WITH LONG-TERM CURRENT USE OF INSULIN: ICD-10-CM

## 2024-09-16 DIAGNOSIS — F32.A DEPRESSION WITH SUICIDAL IDEATION: ICD-10-CM

## 2024-09-16 LAB
ALBUMIN SERPL-MCNC: 2.3 G/DL (ref 3.4–4.8)
ALBUMIN/GLOB SERPL: 0.5 RATIO (ref 1.1–2)
ALP SERPL-CCNC: 108 UNIT/L (ref 40–150)
ALT SERPL-CCNC: 11 UNIT/L (ref 0–55)
AMPHET UR QL SCN: POSITIVE
ANION GAP SERPL CALC-SCNC: 10 MEQ/L
APAP SERPL-MCNC: <3 UG/ML (ref 10–30)
AST SERPL-CCNC: 10 UNIT/L (ref 5–34)
BACTERIA #/AREA URNS AUTO: ABNORMAL /HPF
BARBITURATE SCN PRESENT UR: NEGATIVE
BASOPHILS # BLD AUTO: 0.08 X10(3)/MCL
BASOPHILS NFR BLD AUTO: 0.8 %
BENZODIAZ UR QL SCN: POSITIVE
BILIRUB SERPL-MCNC: 0.2 MG/DL
BILIRUB UR QL STRIP.AUTO: NEGATIVE
BUN SERPL-MCNC: 18 MG/DL (ref 8.4–25.7)
CALCIUM SERPL-MCNC: 8.3 MG/DL (ref 8.8–10)
CANNABINOIDS UR QL SCN: POSITIVE
CHLORIDE SERPL-SCNC: 102 MMOL/L (ref 98–107)
CLARITY UR: CLEAR
CO2 SERPL-SCNC: 23 MMOL/L (ref 23–31)
COCAINE UR QL SCN: NEGATIVE
COLOR UR AUTO: ABNORMAL
CREAT SERPL-MCNC: 0.88 MG/DL (ref 0.73–1.18)
CREAT/UREA NIT SERPL: 20
EOSINOPHIL # BLD AUTO: 0.1 X10(3)/MCL (ref 0–0.9)
EOSINOPHIL NFR BLD AUTO: 1 %
ERYTHROCYTE [DISTWIDTH] IN BLOOD BY AUTOMATED COUNT: 14.4 % (ref 11.5–17)
EST. AVERAGE GLUCOSE BLD GHB EST-MCNC: 231.7 MG/DL
ETHANOL SERPL-MCNC: <10 MG/DL
FENTANYL UR QL SCN: POSITIVE
GFR SERPLBLD CREATININE-BSD FMLA CKD-EPI: >60 ML/MIN/1.73/M2
GLOBULIN SER-MCNC: 4.3 GM/DL (ref 2.4–3.5)
GLUCOSE SERPL-MCNC: 327 MG/DL (ref 82–115)
GLUCOSE UR QL STRIP: ABNORMAL
HBA1C MFR BLD: 9.7 %
HCT VFR BLD AUTO: 29.1 % (ref 42–52)
HGB BLD-MCNC: 9.4 G/DL (ref 14–18)
HGB UR QL STRIP: ABNORMAL
IMM GRANULOCYTES # BLD AUTO: 0.07 X10(3)/MCL (ref 0–0.04)
IMM GRANULOCYTES NFR BLD AUTO: 0.7 %
KETONES UR QL STRIP: NEGATIVE
LEUKOCYTE ESTERASE UR QL STRIP: NEGATIVE
LYMPHOCYTES # BLD AUTO: 3.25 X10(3)/MCL (ref 0.6–4.6)
LYMPHOCYTES NFR BLD AUTO: 31.1 %
MCH RBC QN AUTO: 27 PG (ref 27–31)
MCHC RBC AUTO-ENTMCNC: 32.3 G/DL (ref 33–36)
MCV RBC AUTO: 83.6 FL (ref 80–94)
MDMA UR QL SCN: NEGATIVE
MONOCYTES # BLD AUTO: 0.59 X10(3)/MCL (ref 0.1–1.3)
MONOCYTES NFR BLD AUTO: 5.6 %
NEUTROPHILS # BLD AUTO: 6.37 X10(3)/MCL (ref 2.1–9.2)
NEUTROPHILS NFR BLD AUTO: 60.8 %
NITRITE UR QL STRIP: NEGATIVE
NRBC BLD AUTO-RTO: 0 %
OPIATES UR QL SCN: NEGATIVE
PCP UR QL: NEGATIVE
PH UR STRIP: 5.5 [PH]
PH UR: 5.5 [PH] (ref 3–11)
PLATELET # BLD AUTO: 496 X10(3)/MCL (ref 130–400)
PMV BLD AUTO: 7.9 FL (ref 7.4–10.4)
POCT GLUCOSE: 329 MG/DL (ref 70–110)
POCT GLUCOSE: 337 MG/DL (ref 70–110)
POTASSIUM SERPL-SCNC: 4.1 MMOL/L (ref 3.5–5.1)
PROT SERPL-MCNC: 6.6 GM/DL (ref 5.8–7.6)
PROT UR QL STRIP: ABNORMAL
RBC # BLD AUTO: 3.48 X10(6)/MCL (ref 4.7–6.1)
RBC #/AREA URNS AUTO: ABNORMAL /HPF
SODIUM SERPL-SCNC: 135 MMOL/L (ref 136–145)
SP GR UR STRIP.AUTO: 1.03 (ref 1–1.03)
SPECIFIC GRAVITY, URINE AUTO (.000) (OHS): 1.03 (ref 1–1.03)
SQUAMOUS #/AREA URNS LPF: ABNORMAL /HPF
UROBILINOGEN UR STRIP-ACNC: NORMAL
WBC # BLD AUTO: 10.46 X10(3)/MCL (ref 4.5–11.5)
WBC #/AREA URNS AUTO: ABNORMAL /HPF

## 2024-09-16 PROCEDURE — 96372 THER/PROPH/DIAG INJ SC/IM: CPT | Performed by: EMERGENCY MEDICINE

## 2024-09-16 PROCEDURE — 99285 EMERGENCY DEPT VISIT HI MDM: CPT | Mod: 25

## 2024-09-16 PROCEDURE — 81001 URINALYSIS AUTO W/SCOPE: CPT | Mod: XB | Performed by: NURSE PRACTITIONER

## 2024-09-16 PROCEDURE — 80053 COMPREHEN METABOLIC PANEL: CPT | Performed by: NURSE PRACTITIONER

## 2024-09-16 PROCEDURE — 85025 COMPLETE CBC W/AUTO DIFF WBC: CPT | Performed by: NURSE PRACTITIONER

## 2024-09-16 PROCEDURE — 80307 DRUG TEST PRSMV CHEM ANLYZR: CPT | Performed by: NURSE PRACTITIONER

## 2024-09-16 PROCEDURE — 25000003 PHARM REV CODE 250: Performed by: EMERGENCY MEDICINE

## 2024-09-16 PROCEDURE — 63600175 PHARM REV CODE 636 W HCPCS: Performed by: EMERGENCY MEDICINE

## 2024-09-16 PROCEDURE — 82077 ASSAY SPEC XCP UR&BREATH IA: CPT | Performed by: NURSE PRACTITIONER

## 2024-09-16 PROCEDURE — 80143 DRUG ASSAY ACETAMINOPHEN: CPT | Performed by: NURSE PRACTITIONER

## 2024-09-16 PROCEDURE — 83036 HEMOGLOBIN GLYCOSYLATED A1C: CPT | Performed by: EMERGENCY MEDICINE

## 2024-09-16 PROCEDURE — 82962 GLUCOSE BLOOD TEST: CPT

## 2024-09-16 RX ORDER — ZOLPIDEM TARTRATE 5 MG/1
10 TABLET ORAL NIGHTLY PRN
Status: DISCONTINUED | OUTPATIENT
Start: 2024-09-16 | End: 2024-09-17 | Stop reason: HOSPADM

## 2024-09-16 RX ORDER — INSULIN ASPART 100 [IU]/ML
0-10 INJECTION, SOLUTION INTRAVENOUS; SUBCUTANEOUS
Status: DISCONTINUED | OUTPATIENT
Start: 2024-09-16 | End: 2024-09-17 | Stop reason: HOSPADM

## 2024-09-16 RX ORDER — ALUMINUM HYDROXIDE, MAGNESIUM HYDROXIDE, AND SIMETHICONE 1200; 120; 1200 MG/30ML; MG/30ML; MG/30ML
30 SUSPENSION ORAL EVERY 6 HOURS PRN
Status: DISCONTINUED | OUTPATIENT
Start: 2024-09-16 | End: 2024-09-17 | Stop reason: HOSPADM

## 2024-09-16 RX ORDER — IBUPROFEN 200 MG
1 TABLET ORAL DAILY
Status: DISCONTINUED | OUTPATIENT
Start: 2024-09-17 | End: 2024-09-17 | Stop reason: HOSPADM

## 2024-09-16 RX ORDER — IBUPROFEN 200 MG
24 TABLET ORAL
Status: DISCONTINUED | OUTPATIENT
Start: 2024-09-16 | End: 2024-09-17 | Stop reason: HOSPADM

## 2024-09-16 RX ORDER — ACETAMINOPHEN 325 MG/1
650 TABLET ORAL EVERY 6 HOURS PRN
Status: DISCONTINUED | OUTPATIENT
Start: 2024-09-16 | End: 2024-09-17 | Stop reason: HOSPADM

## 2024-09-16 RX ORDER — ZIPRASIDONE MESYLATE 20 MG/ML
20 INJECTION, POWDER, LYOPHILIZED, FOR SOLUTION INTRAMUSCULAR EVERY 12 HOURS PRN
Status: DISCONTINUED | OUTPATIENT
Start: 2024-09-16 | End: 2024-09-17 | Stop reason: HOSPADM

## 2024-09-16 RX ORDER — HYDROCODONE BITARTRATE AND ACETAMINOPHEN 5; 325 MG/1; MG/1
1 TABLET ORAL
Status: COMPLETED | OUTPATIENT
Start: 2024-09-16 | End: 2024-09-16

## 2024-09-16 RX ORDER — IBUPROFEN 200 MG
16 TABLET ORAL
Status: DISCONTINUED | OUTPATIENT
Start: 2024-09-16 | End: 2024-09-17 | Stop reason: HOSPADM

## 2024-09-16 RX ORDER — GLUCAGON 1 MG
1 KIT INJECTION
Status: DISCONTINUED | OUTPATIENT
Start: 2024-09-16 | End: 2024-09-17 | Stop reason: HOSPADM

## 2024-09-16 RX ORDER — LORAZEPAM 1 MG/1
2 TABLET ORAL EVERY 8 HOURS PRN
Status: DISCONTINUED | OUTPATIENT
Start: 2024-09-16 | End: 2024-09-17 | Stop reason: HOSPADM

## 2024-09-16 RX ADMIN — HYDROCODONE BITARTRATE AND ACETAMINOPHEN 1 TABLET: 5; 325 TABLET ORAL at 09:09

## 2024-09-16 RX ADMIN — INSULIN ASPART 8 UNITS: 100 INJECTION, SOLUTION INTRAVENOUS; SUBCUTANEOUS at 10:09

## 2024-09-17 VITALS
SYSTOLIC BLOOD PRESSURE: 131 MMHG | RESPIRATION RATE: 20 BRPM | TEMPERATURE: 98 F | HEIGHT: 73 IN | OXYGEN SATURATION: 100 % | BODY MASS INDEX: 17.23 KG/M2 | WEIGHT: 130 LBS | HEART RATE: 84 BPM | DIASTOLIC BLOOD PRESSURE: 82 MMHG

## 2024-09-17 NOTE — FIRST PROVIDER EVALUATION
"Medical screening examination initiated.  I have conducted a focused provider triage encounter, findings are as follows:    Brief history of present illness:  60 y/o male presents with needing clearance to go to [facility] for suicidal ideations. States mom in hospital dying and just a lot going on. Also c/o right foot pain/swelling.     Vitals:    09/16/24 1936   BP: 116/75   Pulse: 97   Resp: 18   Temp: 98.5 °F (36.9 °C)   TempSrc: Oral   SpO2: 100%   Weight: 59 kg (130 lb)   Height: 6' 1" (1.854 m)       Pertinent physical exam:  alert, nonlabored, +2 pedal pulse to right foot     Brief workup plan:  work up    Preliminary workup initiated; this workup will be continued and followed by the physician or advanced practice provider that is assigned to the patient when roomed.  "

## 2024-10-08 ENCOUNTER — LAB REQUISITION (OUTPATIENT)
Dept: LAB | Facility: HOSPITAL | Age: 59
End: 2024-10-08
Payer: MEDICARE

## 2024-10-08 DIAGNOSIS — R77.0 ABNORMALITY OF ALBUMIN: ICD-10-CM

## 2024-10-08 DIAGNOSIS — R73.09 OTHER ABNORMAL GLUCOSE: ICD-10-CM

## 2024-10-08 DIAGNOSIS — N18.9 CHRONIC KIDNEY DISEASE, UNSPECIFIED: ICD-10-CM

## 2024-10-08 DIAGNOSIS — N40.1 BENIGN PROSTATIC HYPERPLASIA WITH LOWER URINARY TRACT SYMPTOMS: ICD-10-CM

## 2024-10-08 DIAGNOSIS — E03.9 HYPOTHYROIDISM, UNSPECIFIED: ICD-10-CM

## 2024-10-08 DIAGNOSIS — D64.9 ANEMIA, UNSPECIFIED: ICD-10-CM

## 2024-10-08 DIAGNOSIS — E78.9 DISORDER OF LIPOPROTEIN METABOLISM, UNSPECIFIED: ICD-10-CM

## 2024-10-08 LAB
ALBUMIN SERPL-MCNC: 2.8 G/DL (ref 3.4–4.8)
ALBUMIN/GLOB SERPL: 0.7 RATIO (ref 1.1–2)
ALP SERPL-CCNC: 85 UNIT/L (ref 40–150)
ALT SERPL-CCNC: 22 UNIT/L (ref 0–55)
ANION GAP SERPL CALC-SCNC: 7 MEQ/L
AST SERPL-CCNC: 20 UNIT/L (ref 5–34)
BASOPHILS # BLD AUTO: 0.07 X10(3)/MCL
BASOPHILS NFR BLD AUTO: 0.7 %
BILIRUB SERPL-MCNC: 0.2 MG/DL
BUN SERPL-MCNC: 15.8 MG/DL (ref 8.4–25.7)
CALCIUM SERPL-MCNC: 8.8 MG/DL (ref 8.8–10)
CHLORIDE SERPL-SCNC: 102 MMOL/L (ref 98–107)
CHOLEST SERPL-MCNC: 225 MG/DL
CHOLEST/HDLC SERPL: 3 {RATIO} (ref 0–5)
CO2 SERPL-SCNC: 31 MMOL/L (ref 23–31)
CREAT SERPL-MCNC: 0.74 MG/DL (ref 0.73–1.18)
CREAT/UREA NIT SERPL: 21
EOSINOPHIL # BLD AUTO: 0.24 X10(3)/MCL (ref 0–0.9)
EOSINOPHIL NFR BLD AUTO: 2.5 %
ERYTHROCYTE [DISTWIDTH] IN BLOOD BY AUTOMATED COUNT: 15.5 % (ref 11.5–17)
EST. AVERAGE GLUCOSE BLD GHB EST-MCNC: 214.5 MG/DL
GFR SERPLBLD CREATININE-BSD FMLA CKD-EPI: >60 ML/MIN/1.73/M2
GLOBULIN SER-MCNC: 4.2 GM/DL (ref 2.4–3.5)
GLUCOSE SERPL-MCNC: 178 MG/DL (ref 82–115)
HBA1C MFR BLD: 9.1 %
HCT VFR BLD AUTO: 29 % (ref 42–52)
HDLC SERPL-MCNC: 81 MG/DL (ref 35–60)
HGB BLD-MCNC: 9 G/DL (ref 14–18)
IMM GRANULOCYTES # BLD AUTO: 0.04 X10(3)/MCL (ref 0–0.04)
IMM GRANULOCYTES NFR BLD AUTO: 0.4 %
LDLC SERPL CALC-MCNC: 117 MG/DL (ref 50–140)
LYMPHOCYTES # BLD AUTO: 2.83 X10(3)/MCL (ref 0.6–4.6)
LYMPHOCYTES NFR BLD AUTO: 29.1 %
MCH RBC QN AUTO: 26.8 PG (ref 27–31)
MCHC RBC AUTO-ENTMCNC: 31 G/DL (ref 33–36)
MCV RBC AUTO: 86.3 FL (ref 80–94)
MONOCYTES # BLD AUTO: 1 X10(3)/MCL (ref 0.1–1.3)
MONOCYTES NFR BLD AUTO: 10.3 %
NEUTROPHILS # BLD AUTO: 5.53 X10(3)/MCL (ref 2.1–9.2)
NEUTROPHILS NFR BLD AUTO: 57 %
NRBC BLD AUTO-RTO: 0 %
PLATELET # BLD AUTO: 426 X10(3)/MCL (ref 130–400)
PMV BLD AUTO: 9 FL (ref 7.4–10.4)
POTASSIUM SERPL-SCNC: 4.9 MMOL/L (ref 3.5–5.1)
PREALB SERPL-MCNC: 20.4 MG/DL (ref 16–42)
PROT SERPL-MCNC: 7 GM/DL (ref 5.8–7.6)
PSA SERPL-MCNC: 0.29 NG/ML
RBC # BLD AUTO: 3.36 X10(6)/MCL (ref 4.7–6.1)
SODIUM SERPL-SCNC: 140 MMOL/L (ref 136–145)
TRIGL SERPL-MCNC: 137 MG/DL (ref 34–140)
TSH SERPL-ACNC: 4 UIU/ML (ref 0.35–4.94)
VLDLC SERPL CALC-MCNC: 27 MG/DL
WBC # BLD AUTO: 9.71 X10(3)/MCL (ref 4.5–11.5)

## 2024-10-08 PROCEDURE — 84443 ASSAY THYROID STIM HORMONE: CPT | Performed by: INTERNAL MEDICINE

## 2024-10-08 PROCEDURE — 84436 ASSAY OF TOTAL THYROXINE: CPT | Performed by: INTERNAL MEDICINE

## 2024-10-08 PROCEDURE — 80053 COMPREHEN METABOLIC PANEL: CPT | Performed by: INTERNAL MEDICINE

## 2024-10-08 PROCEDURE — 85025 COMPLETE CBC W/AUTO DIFF WBC: CPT | Performed by: INTERNAL MEDICINE

## 2024-10-08 PROCEDURE — 84134 ASSAY OF PREALBUMIN: CPT | Performed by: INTERNAL MEDICINE

## 2024-10-08 PROCEDURE — 84153 ASSAY OF PSA TOTAL: CPT | Performed by: INTERNAL MEDICINE

## 2024-10-08 PROCEDURE — 80061 LIPID PANEL: CPT | Performed by: INTERNAL MEDICINE

## 2024-10-08 PROCEDURE — 83036 HEMOGLOBIN GLYCOSYLATED A1C: CPT | Performed by: INTERNAL MEDICINE

## 2024-10-09 LAB
FT4I SERPL CALC-MCNC: 3.6 MCG/DL (ref 4.8–12.7)
T4 SERPL IA-MCNC: 4 MCG/DL (ref 4.5–11.7)
TOTAL TBC SERPL: 1.1 TBI (ref 0.8–1.3)

## 2024-10-16 ENCOUNTER — PATIENT MESSAGE (OUTPATIENT)
Dept: RESEARCH | Facility: HOSPITAL | Age: 59
End: 2024-10-16
Payer: MEDICARE

## 2024-11-27 ENCOUNTER — PATIENT MESSAGE (OUTPATIENT)
Dept: RESEARCH | Facility: HOSPITAL | Age: 59
End: 2024-11-27
Payer: MEDICARE